# Patient Record
Sex: FEMALE | Race: WHITE | Employment: OTHER | URBAN - METROPOLITAN AREA
[De-identification: names, ages, dates, MRNs, and addresses within clinical notes are randomized per-mention and may not be internally consistent; named-entity substitution may affect disease eponyms.]

---

## 2018-04-23 PROBLEM — I62.00 SUBDURAL BLEEDING (HCC): Status: ACTIVE | Noted: 2018-04-23

## 2018-07-12 ENCOUNTER — OFFICE VISIT (OUTPATIENT)
Dept: ORTHOPEDIC SURGERY | Facility: CLINIC | Age: 68
End: 2018-07-12

## 2018-07-12 VITALS
DIASTOLIC BLOOD PRESSURE: 61 MMHG | OXYGEN SATURATION: 97 % | HEART RATE: 92 BPM | BODY MASS INDEX: 29.63 KG/M2 | TEMPERATURE: 98.7 F | SYSTOLIC BLOOD PRESSURE: 109 MMHG | HEIGHT: 62 IN | RESPIRATION RATE: 16 BRPM | WEIGHT: 161 LBS

## 2018-07-12 DIAGNOSIS — G89.29 CHRONIC BILATERAL BACK PAIN, UNSPECIFIED BACK LOCATION: ICD-10-CM

## 2018-07-12 DIAGNOSIS — M25.561 CHRONIC PAIN OF RIGHT KNEE: ICD-10-CM

## 2018-07-12 DIAGNOSIS — M54.9 CHRONIC BILATERAL BACK PAIN, UNSPECIFIED BACK LOCATION: ICD-10-CM

## 2018-07-12 DIAGNOSIS — G89.29 CHRONIC PAIN OF RIGHT KNEE: ICD-10-CM

## 2018-07-12 DIAGNOSIS — S06.5XAA SUBDURAL HEMATOMA: ICD-10-CM

## 2018-07-12 DIAGNOSIS — S16.1XXA STRAIN OF NECK MUSCLE, INITIAL ENCOUNTER: ICD-10-CM

## 2018-07-12 DIAGNOSIS — S82.131D RIGHT MEDIAL TIBIAL PLATEAU FRACTURE, CLOSED, WITH ROUTINE HEALING, SUBSEQUENT ENCOUNTER: Primary | ICD-10-CM

## 2018-07-12 DIAGNOSIS — M54.2 NECK PAIN: ICD-10-CM

## 2018-07-12 DIAGNOSIS — S39.012A STRAIN OF LUMBAR REGION, INITIAL ENCOUNTER: ICD-10-CM

## 2018-07-12 DIAGNOSIS — R41.3 MEMORY DEFICIT: ICD-10-CM

## 2018-07-12 RX ORDER — BUPROPION HYDROCHLORIDE 100 MG/1
TABLET, EXTENDED RELEASE ORAL
COMMUNITY
End: 2018-12-15

## 2018-07-12 NOTE — PATIENT INSTRUCTIONS
Knee Pain or Injury: Care Instructions Your Care Instructions Injuries are a common cause of knee problems. Sudden (acute) injuries may be caused by a direct blow to the knee. They can also be caused by abnormal twisting, bending, or falling on the knee. Pain, bruising, or swelling may be severe, and may start within minutes of the injury. Overuse is another cause of knee pain. Other causes are climbing stairs, kneeling, and other activities that use the knee. Everyday wear and tear, especially as you get older, also can cause knee pain. Rest, along with home treatment, often relieves pain and allows your knee to heal. If you have a serious knee injury, you may need tests and treatment. Follow-up care is a key part of your treatment and safety. Be sure to make and go to all appointments, and call your doctor if you are having problems. It's also a good idea to know your test results and keep a list of the medicines you take. How can you care for yourself at home? · Be safe with medicines. Read and follow all instructions on the label. ¨ If the doctor gave you a prescription medicine for pain, take it as prescribed. ¨ If you are not taking a prescription pain medicine, ask your doctor if you can take an over-the-counter medicine. · Rest and protect your knee. Take a break from any activity that may cause pain. · Put ice or a cold pack on your knee for 10 to 20 minutes at a time. Put a thin cloth between the ice and your skin. · Prop up a sore knee on a pillow when you ice it or anytime you sit or lie down for the next 3 days. Try to keep it above the level of your heart. This will help reduce swelling. · If your knee is not swollen, you can put moist heat, a heating pad, or a warm cloth on your knee. · If your doctor recommends an elastic bandage, sleeve, or other type of support for your knee, wear it as directed.  
· Follow your doctor's instructions about how much weight you can put on your leg. Use a cane, crutches, or a walker as instructed. · Follow your doctor's instructions about activity during your healing process. If you can do mild exercise, slowly increase your activity. · Reach and stay at a healthy weight. Extra weight can strain the joints, especially the knees and hips, and make the pain worse. Losing even a few pounds may help. When should you call for help? Call 911 anytime you think you may need emergency care. For example, call if: 
  · You have symptoms of a blood clot in your lung (called a pulmonary embolism). These may include: 
¨ Sudden chest pain. ¨ Trouble breathing. ¨ Coughing up blood.  
 Call your doctor now or seek immediate medical care if: 
  · You have severe or increasing pain.  
  · Your leg or foot turns cold or changes color.  
  · You cannot stand or put weight on your knee.  
  · Your knee looks twisted or bent out of shape.  
  · You cannot move your knee.  
  · You have signs of infection, such as: 
¨ Increased pain, swelling, warmth, or redness. ¨ Red streaks leading from the knee. ¨ Pus draining from a place on your knee. ¨ A fever.  
  · You have signs of a blood clot in your leg (called a deep vein thrombosis), such as: 
¨ Pain in your calf, back of the knee, thigh, or groin. ¨ Redness and swelling in your leg or groin.  
 Watch closely for changes in your health, and be sure to contact your doctor if: 
  · You have tingling, weakness, or numbness in your knee.  
  · You have any new symptoms, such as swelling.  
  · You have bruises from a knee injury that last longer than 2 weeks.  
  · You do not get better as expected. Where can you learn more? Go to http://kristofer-dori.info/. Enter K195 in the search box to learn more about \"Knee Pain or Injury: Care Instructions. \" Current as of: November 20, 2017 Content Version: 11.7 © 2097-4130 [employer].  Care instructions adapted under license by [facility] (which disclaims liability or warranty for this information). If you have questions about a medical condition or this instruction, always ask your healthcare professional. [address] any warranty or liability for your use of this information. Broken Lower Leg: Care Instructions Your Care Instructions Treatment for your broken leg will depend on how bad the break is. Your doctor may have put your lower leg in a splint or a cast to allow it to heal or keep it stable until you see another doctor. It may take weeks or months for your leg to heal. You can help it heal with some care at home. You heal best when you take good care of yourself. Eat a variety of healthy foods, and don't smoke. Follow-up care is a key part of your treatment and safety. Be sure to make and go to all appointments, and call your doctor if you are having problems. It's also a good idea to know your test results and keep a list of the medicines you take. How can you care for yourself at home? · Put ice or a cold pack on your lower leg for 10 to 20 minutes at a time. Try to do this every 1 to 2 hours for the next 3 days (when you are awake). Put a thin cloth between the ice and your cast or splint. Keep your cast or splint dry. · Follow the cast care instructions your doctor gives you. If you have a splint, do not take it off unless your doctor tells you to. · Be safe with medicines. Take pain medicines exactly as directed. ¨ If the doctor gave you a prescription medicine for pain, take it as prescribed. ¨ If you are not taking a prescription pain medicine, ask your doctor if you can take an over-the-counter medicine. · Do not put weight on your leg unless your doctor tells you to. Use crutches to walk. · Prop up your leg on pillows when you sit or lie down in the first few days after the injury. Keep your leg higher than the level of your heart. This will help reduce swelling.  
· Follow instructions for exercises to keep your leg strong. · Wiggle your toes often to reduce swelling and stiffness. When should you call for help? Call 911 anytime you think you may need emergency care. For example, call if: 
  · You have chest pain, are short of breath, or you cough up blood.  
  · You are very sleepy and you have trouble waking up.  
 Call your doctor now or seek immediate medical care if: 
  · You have new or worse nausea or vomiting.  
  · You have new or worse pain.  
  · Your foot is cool or pale or changes color.  
  · You have tingling, weakness, or numbness in your toes.  
  · Your cast or splint feels too tight.  
  · You have signs of a blood clot in your leg (called a deep vein thrombosis), such as: 
¨ Pain in your calf, back of the knee, thigh, or groin. ¨ Redness or swelling in your leg.  
 Watch closely for changes in your health, and be sure to contact your doctor if: 
  · You have a problem with your splint or cast.  
  · You do not get better as expected. Where can you learn more? Go to http://kristofer-dori.info/. Enter L198 in the search box to learn more about \"Broken Lower Leg: Care Instructions. \" Current as of: November 29, 2017 Content Version: 11.7 © 4631-7757 [employer]. Care instructions adapted under license by [employer] (which disclaims liability or warranty for this information). If you have questions about a medical condition or this instruction, always ask your healthcare professional. [place] [address] any warranty or liability for your use of this information.
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
## 2018-07-18 ENCOUNTER — DOCUMENTATION ONLY (OUTPATIENT)
Dept: ORTHOPEDIC SURGERY | Facility: CLINIC | Age: 68
End: 2018-07-18

## 2018-08-08 ENCOUNTER — TELEPHONE (OUTPATIENT)
Dept: ORTHOPEDIC SURGERY | Facility: CLINIC | Age: 68
End: 2018-08-08

## 2018-08-08 NOTE — TELEPHONE ENCOUNTER
Bar riojas/ Sosa Jerez patient's  is [id number] on [facility]18 be mailed to the patient as it is difficulty for patient to get to office.   Please call patient at [phone]

## 2019-04-08 ENCOUNTER — OFFICE VISIT (OUTPATIENT)
Dept: ORTHOPEDIC SURGERY | Age: 69
End: 2019-04-08

## 2019-04-08 VITALS
SYSTOLIC BLOOD PRESSURE: 119 MMHG | OXYGEN SATURATION: 96 % | TEMPERATURE: 98.3 F | DIASTOLIC BLOOD PRESSURE: 73 MMHG | RESPIRATION RATE: 12 BRPM | HEIGHT: 62 IN | WEIGHT: 186.4 LBS | BODY MASS INDEX: 34.3 KG/M2 | HEART RATE: 66 BPM

## 2019-04-08 DIAGNOSIS — M54.50 LUMBAR PAIN: ICD-10-CM

## 2019-04-08 DIAGNOSIS — Z87.828 HISTORY OF MOTOR VEHICLE ACCIDENT: ICD-10-CM

## 2019-04-08 DIAGNOSIS — Z72.0 TOBACCO USE: ICD-10-CM

## 2019-04-08 DIAGNOSIS — S32.050D CLOSED COMPRESSION FRACTURE OF L5 LUMBAR VERTEBRA WITH ROUTINE HEALING, SUBSEQUENT ENCOUNTER: ICD-10-CM

## 2019-04-08 DIAGNOSIS — M47.816 LUMBAR FACET ARTHROPATHY: Primary | ICD-10-CM

## 2019-04-08 RX ORDER — MEMANTINE HYDROCHLORIDE 5 MG/1
1 TABLET ORAL 2 TIMES DAILY
Refills: 6 | COMMUNITY
Start: 2019-03-12 | End: 2022-04-11

## 2019-04-08 RX ORDER — METHYLPHENIDATE HYDROCHLORIDE 10 MG/1
10 TABLET ORAL DAILY
COMMUNITY
End: 2022-04-11

## 2019-04-08 RX ORDER — MECLIZINE HYDROCHLORIDE 25 MG/1
1-2 TABLET ORAL AS NEEDED
Refills: 6 | COMMUNITY
Start: 2019-03-24 | End: 2022-04-11

## 2019-04-08 RX ORDER — TAMSULOSIN HYDROCHLORIDE 0.4 MG/1
0.4 CAPSULE ORAL DAILY
COMMUNITY
End: 2019-04-21
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
## 2019-04-08 NOTE — PATIENT INSTRUCTIONS
Preventing Falls: Care Instructions Your Care Instructions Getting around your home safely can be a challenge if you have injuries or health problems that make it easy for you to fall. Loose rugs and furniture in walkways are among the dangers for many older people who have problems walking or who have poor eyesight. People who have conditions such as arthritis, osteoporosis, or dementia also have to be careful not to fall. You can make your home safer with a few simple measures. Follow-up care is a key part of your treatment and safety. Be sure to make and go to all appointments, and call your doctor if you are having problems. It's also a good idea to know your test results and keep a list of the medicines you take. How can you care for yourself at home? Taking care of yourself · You may get dizzy if you do not drink enough water. To prevent dehydration, drink plenty of fluids, enough so that your urine is light yellow or clear like water. Choose water and other caffeine-free clear liquids. If you have kidney, heart, or liver disease and have to limit fluids, talk with your doctor before you increase the amount of fluids you drink. · Exercise regularly to improve your strength, muscle tone, and balance. Walk if you can. Swimming may be a good choice if you cannot walk easily. · Have your vision and hearing checked each year or any time you notice a change. If you have trouble seeing and hearing, you might not be able to avoid objects and could lose your balance. · Know the side effects of the medicines you take. Ask your doctor or pharmacist whether the medicines you take can affect your balance. Sleeping pills or sedatives can affect your balance. · Limit the amount of alcohol you drink. Alcohol can impair your balance and other senses. · Ask your doctor whether calluses or corns on your feet need to be removed.  If you wear loose-fitting shoes because of calluses or corns, you can lose your balance and fall. · Talk to your doctor if you have numbness in your feet. Preventing falls at home · Remove raised doorway thresholds, throw rugs, and clutter. Repair loose carpet or raised areas in the floor. · Move furniture and electrical cords to keep them out of walking paths. · Use nonskid floor wax, and wipe up spills right away, especially on ceramic tile floors. · If you use a walker or cane, put rubber tips on it. If you use crutches, clean the bottoms of them regularly with an abrasive pad, such as steel wool. · Keep your house well lit, especially Lieutenant Angy, and outside walkways. Use night-lights in areas such as hallways and bathrooms. Add extra light switches or use remote switches (such as switches that go on or off when you clap your hands) to make it easier to turn lights on if you have to get up during the night. · Install sturdy handrails on stairways. · Move items in your cabinets so that the things you use a lot are on the lower shelves (about waist level). · Keep a cordless phone and a flashlight with new batteries by your bed. If possible, put a phone in each of the main rooms of your house, or carry a cell phone in case you fall and cannot reach a phone. Or, you can wear a device around your neck or wrist. You push a button that sends a signal for help. · Wear low-heeled shoes that fit well and give your feet good support. Use footwear with nonskid soles. Check the heels and soles of your shoes for wear. Repair or replace worn heels or soles. · Do not wear socks without shoes on wood floors. · Walk on the grass when the sidewalks are slippery. If you live in an area that gets snow and ice in the winter, sprinkle salt on slippery steps and sidewalks. Preventing falls in the bath · Install grab bars and nonskid mats inside and outside your shower or tub and near the toilet and sinks. · Use shower chairs and bath benches. · Use a hand-held shower head that will allow you to sit while showering. · Get into a tub or shower by putting the weaker leg in first. Get out of a tub or shower with your strong side first. 
· Repair loose toilet seats and consider installing a raised toilet seat to make getting on and off the toilet easier. · Keep your bathroom door unlocked while you are in the shower. Where can you learn more? Go to http://kristofer-dori.info/. Enter [id number] [id number] in the search box to learn more about \"Preventing Falls: Care Instructions. \" Current as of: March 15, 2018 Content Version: 11.9 © 0926-4370 [employer]. Care instructions adapted under license by "[employer]" (which disclaims liability or warranty for this information). If you have questions about a medical condition or this instruction, always ask your healthcare professional. [place] [address] any warranty or liability for your use of this information. Preventing Falls: Care Instructions Your Care Instructions Getting around your home safely can be a challenge if you have injuries or health problems that make it easy for you to fall. Loose rugs and furniture in walkways are among the dangers for many older people who have problems walking or who have poor eyesight. People who have conditions such as arthritis, osteoporosis, or dementia also have to be careful not to fall. You can make your home safer with a few simple measures. Follow-up care is a key part of your treatment and safety. Be sure to make and go to all appointments, and call your doctor if you are having problems. It's also a good idea to know your test results and keep a list of the medicines you take. How can you care for yourself at home? Taking care of yourself · You may get dizzy if you do not drink enough water.  To prevent dehydration, drink plenty of fluids, enough so that your urine is light yellow or clear like water. Choose water and other caffeine-free clear liquids. If you have kidney, heart, or liver disease and have to limit fluids, talk with your doctor before you increase the amount of fluids you drink. · Exercise regularly to improve your strength, muscle tone, and balance. Walk if you can. Swimming may be a good choice if you cannot walk easily. · Have your vision and hearing checked each year or any time you notice a change. If you have trouble seeing and hearing, you might not be able to avoid objects and could lose your balance. · Know the side effects of the medicines you take. Ask your doctor or pharmacist whether the medicines you take can affect your balance. Sleeping pills or sedatives can affect your balance. · Limit the amount of alcohol you drink. Alcohol can impair your balance and other senses. · Ask your doctor whether calluses or corns on your feet need to be removed. If you wear loose-fitting shoes because of calluses or corns, you can lose your balance and fall. · Talk to your doctor if you have numbness in your feet. Preventing falls at home · Remove raised doorway thresholds, throw rugs, and clutter. Repair loose carpet or raised areas in the floor. · Move furniture and electrical cords to keep them out of walking paths. · Use nonskid floor wax, and wipe up spills right away, especially on ceramic tile floors. · If you use a walker or cane, put rubber tips on it. If you use crutches, clean the bottoms of them regularly with an abrasive pad, such as steel wool. · Keep your house well lit, especially Kaylin Welch, and outside walkways. Use night-lights in areas such as hallways and bathrooms. Add extra light switches or use remote switches (such as switches that go on or off when you clap your hands) to make it easier to turn lights on if you have to get up during the night. · Install sturdy handrails on stairways. · Move items in your cabinets so that the things you use a lot are on the lower shelves (about waist level). · Keep a cordless phone and a flashlight with new batteries by your bed. If possible, put a phone in each of the main rooms of your house, or carry a cell phone in case you fall and cannot reach a phone. Or, you can wear a device around your neck or wrist. You push a button that sends a signal for help. · Wear low-heeled shoes that fit well and give your feet good support. Use footwear with nonskid soles. Check the heels and soles of your shoes for wear. Repair or replace worn heels or soles. · Do not wear socks without shoes on wood floors. · Walk on the grass when the sidewalks are slippery. If you live in an area that gets snow and ice in the winter, sprinkle salt on slippery steps and sidewalks. Preventing falls in the bath · Install grab bars and nonskid mats inside and outside your shower or tub and near the toilet and sinks. · Use shower chairs and bath benches. · Use a hand-held shower head that will allow you to sit while showering. · Get into a tub or shower by putting the weaker leg in first. Get out of a tub or shower with your strong side first. 
· Repair loose toilet seats and consider installing a raised toilet seat to make getting on and off the toilet easier. · Keep your bathroom door unlocked while you are in the shower. Where can you learn more? Go to http://kristofer-dori.info/. Enter [id number] in the search box to learn more about \"Preventing Falls: Care Instructions. \" Current as of: March 15, 2018 Content Version: 11.9 © 8915-0288 [employer]. Care instructions adapted under license by [employer] (which disclaims liability or warranty for this information).  If you have questions about a medical condition or this instruction, always ask your healthcare professional. [employer] disclaims any warranty or liability for your use of this information. Neck Arthritis: Exercises Your Care Instructions Here are some examples of typical rehabilitation exercises for your condition. Start each exercise slowly. Ease off the exercise if you start to have pain. Your doctor or physical therapist will tell you when you can start these exercises and which ones will work best for you. How to do the exercises Neck stretches to the side 1. This stretch works best if you keep your shoulder down as you lean away from it. To help you remember to do this, start by relaxing your shoulders and lightly holding on to your thighs or your chair. 2. Tilt your head toward your shoulder and hold for 15 to 30 seconds. Let the weight of your head stretch your muscles. 3. Repeat 2 to 4 times toward each shoulder. Chin tuck 1. Lie on the floor with a rolled-up towel under your neck. Your head should be touching the floor. 2. Slowly bring your chin toward your chest. 
3. Hold for a count of 6, and then relax for up to 10 seconds. 4. Repeat 8 to 12 times. Active cervical rotation 1. Sit in a firm chair, or stand up straight. 2. Keeping your chin level, turn your head to the right, and hold for 15 to 30 seconds. 3. Turn your head to the left and hold for 15 to 30 seconds. 4. Repeat 2 to 4 times to each side. Shoulder blade squeeze 1. While standing, squeeze your shoulder blades together. 2. Do not raise your shoulders up as you are squeezing. 3. Hold for 6 seconds. 4. Repeat 8 to 12 times. Shoulder rolls 1. Sit comfortably with your feet shoulder-width apart. You can also do this exercise standing up. 2. Roll your shoulders up, then back, and then down in a smooth, circular motion. 3. Repeat 2 to 4 times. Follow-up care is a key part of your treatment and safety.  Be sure to make and go to all appointments, and call your doctor if you are having problems. It's also a good idea to know your test results and keep a list of the medicines you take. Where can you learn more? Go to http://kristofer-dori.info/. Enter G576 in the search box to learn more about \"Neck Arthritis: Exercises. \" Current as of: September 20, 2018 Content Version: 11.9 © 8949-7804 [employer]. Care instructions adapted under license by [employer]® (which disclaims liability or warranty for this information). If you have questions about a medical condition or this instruction, always ask your healthcare professional. [address] any warranty or liability for your use of this information. Low Back Arthritis: Exercises Your Care Instructions Here are some examples of typical rehabilitation exercises for your condition. Start each exercise slowly. Ease off the exercise if you start to have pain. Your doctor or physical therapist will tell you when you can start these exercises and which ones will work best for you. When you are not being active, find a comfortable position for rest. Some people are comfortable on the floor or a medium-firm bed with a small pillow under their head and another under their knees. Some people prefer to lie on their side with a pillow between their knees. Don't stay in one position for too long. Take short walks (10 to 20 minutes) every 2 to 3 hours. Avoid slopes, hills, and stairs until you feel better. Walk only distances you can manage without pain, especially leg pain. How to do the exercises Pelvic tilt 4. Lie on your back with your knees bent. 5. \"Brace\" your stomachtighten your muscles by pulling in and imagining your belly button moving toward your spine. 6. Press your lower back into the floor. You should feel your hips and pelvis rock back. 7. Hold for 6 seconds while breathing smoothly. 8. Relax and allow your pelvis and hips to rock forward. 9. Repeat 8 to 12 times. Back stretches 5. Get down on your hands and knees on the floor. 6. Relax your head and allow it to droop. Round your back up toward the ceiling until you feel a nice stretch in your upper, middle, and lower back. Hold this stretch for as long as it feels comfortable, or about 15 to 30 seconds. 7. Return to the starting position with a flat back while you are on your hands and knees. 8. Let your back sway by pressing your stomach toward the floor. Lift your buttocks toward the ceiling. 9. Hold this position for 15 to 30 seconds. 10. Repeat 2 to 4 times. Follow-up care is a key part of your treatment and safety. Be sure to make and go to all appointments, and call your doctor if you are having problems. It's also a good idea to know your test results and keep a list of the medicines you take. Where can you learn more? Go to http://kristofer-dori.info/. Enter H014 in the search box to learn more about \"Low Back Arthritis: Exercises. \" Current as of: September 20, 2018 Content Version: 11.9 © 8281-0429 [employer]. Care instructions adapted under license by [employer] (which disclaims liability or warranty for this information). If you have questions about a medical condition or this instruction, always ask your healthcare professional. [address] any warranty or liability for your use of this information. Learning About Benefits From Quitting Smoking How does quitting smoking make you healthier? If you're thinking about quitting smoking, you may have a few reasons to be smoke-free. Your health may be one of them. · When you quit smoking, you lower your risks for cancer, lung disease, heart attack, stroke, blood vessel disease, and blindness from macular degeneration. · When you're smoke-free, you get sick less often, and you heal faster. You are less likely to get colds, flu, bronchitis, and pneumonia. · As a nonsmoker, you may find that your mood is better and you are less stressed. When and how will you feel healthier? Quitting has real health benefits that start from day 1 of being smoke-free. And the longer you stay smoke-free, the healthier you get and the better you feel. The first hours · After just 20 minutes, your blood pressure and heart rate go down. That means there's less stress on your heart and blood vessels. · Within 12 hours, the level of carbon monoxide in your blood drops back to normal. That makes room for more oxygen. With more oxygen in your body, you may notice that you have more energy than when you smoked. After 2 weeks · Your lungs start to work better. · Your risk of heart attack starts to drop. After 1 month · When your lungs are clear, you cough less and breathe deeper, so it's easier to be active. · Your sense of taste and smell return. That means you can enjoy food more than you have since you started smoking. Over the years · After 1 year, your risk of heart disease is half what it would be if you kept smoking. · After 5 years, your risk of stroke starts to shrink. Within a few years after that, it's about the same as if you'd never smoked. · After 10 years, your risk of dying from lung cancer is cut by about half. And your risk for many other types of cancer is lower too. How would quitting help others in your life? When you quit smoking, you improve the health of everyone who now breathes in your smoke. · Their heart, lung, and cancer risks drop, much like yours. · They are sick less. For babies and small children, living smoke-free means they're less likely to have ear infections, pneumonia, and bronchitis. · If you're a woman who is or will be pregnant someday, quitting smoking means a healthier . · Children who are close to you are less likely to become adult smokers. Where can you learn more? Go to http://kristofer-dori.info/. Enter [id number] in the search box to learn more about \"Learning About Benefits From Quitting Smoking. \" Current as of: September 26, 2018 Content Version: 11.9 © 8397-3541 [employer]. Care instructions adapted under license by [employer] (which disclaims liability or warranty for this information). If you have questions about a medical condition or this instruction, always ask your healthcare professional. [place] [address] any warranty or liability for your use of this information.

## 2019-04-08 NOTE — PROGRESS NOTES
Christina Herbert presents today for Chief Complaint Patient presents with  Back Pain  
  lower  New Patient  
  referred by Dr. Kennedy Alt Is someone accompanying this pt? no 
 
Is the patient using any DME equipment during [address]? NO Depression Screening: 
3 most recent PHQ Screens 4/8/2019 PHQ Not Done Active Diagnosis of Depression or Bipolar Disorder Little interest or pleasure in doing things Several days Feeling down, depressed, irritable, or hopeless Several days Total Score PHQ 2 2 Trouble falling or staying asleep, or sleeping too much - Feeling tired or having little energy - Poor appetite, weight loss, or overeating - Feeling bad about yourself - or that you are a failure or have let yourself or your family down - Trouble concentrating on things such as school, work, reading, or watching TV - Moving or speaking so slowly that other people could have noticed; or the opposite being so fidgety that others notice - Thoughts of being better off dead, or hurting yourself in some way -  
PHQ 9 Score - How difficult have these problems made it for you to do your work, take care of your home and get along with others - Learning Assessment: 
Learning Assessment 4/8/2019 PRIMARY LEARNER Patient HIGHEST LEVEL OF EDUCATION - PRIMARY LEARNER  GRADUATED HIGH SCHOOL OR GED  
BARRIERS PRIMARY LEARNER COGNITIVE  
CO-LEARNER CAREGIVER No  
PRIMARY LANGUAGE ENGLISH  
LEARNER PREFERENCE PRIMARY DEMONSTRATION  
ANSWERED BY Patient RELATIONSHIP SELF Abuse Screening: No flowsheet data found. Fall Risk Fall Risk Assessment, last 12 mths 4/8/2019 Able to walk? Yes Fall in past 12 months? Yes Fall with injury? Yes  
Number of falls in past 12 months 1 Fall Risk Score 2  
 
 
OPIOID RISK TOOL No flowsheet data found. Pt currently taking Antiplatelet therapy? No 
 
Coordination of Care: 1. Have you been to the ER, urgent care clinic since your last visit?  Yes, [facility] ED in 12/18, 03/19 for kidney stones  Hospitalized since your last visit? No 
 
2. Have you seen or consulted any other health care providers outside of the [address] [place] [address] since your last visit? No Include any pap smears or colon screening. No 
 
Last  Checked 04/08/19 Last UDS Checked N/A Last Pain Agreement Signed n/a

## 2019-04-08 NOTE — LETTER
4/9/19 Patient: Pascale Collier YOB: 1950 Date of Visit: 4/8/2019 Laura Handy MD 
[address] [facility] [address] [address] VIA Facsimile: [phone] Dear Laura Handy MD, Thank you for referring Ms. Camila Medina to [facility] for evaluation. My notes for this consultation are attached. If you have questions, please do not hesitate to call me. I look forward to following your patient along with you. Sincerely, Carmen Harmon MD

## 2019-05-02 ENCOUNTER — HOSPITAL ENCOUNTER (OUTPATIENT)
Dept: PREADMISSION TESTING | Age: 69
Discharge: HOME | End: 2019-05-02
Payer: MEDICARE

## 2019-05-02 ENCOUNTER — ANESTHESIA EVENT (OUTPATIENT)
Dept: SURGERY | Age: 69
End: 2019-05-02
Payer: MEDICARE

## 2019-05-02 ENCOUNTER — OFFICE VISIT (OUTPATIENT)
Dept: UROLOGY | Age: 69
End: 2019-05-02

## 2019-05-02 VITALS
HEART RATE: 111 BPM | SYSTOLIC BLOOD PRESSURE: 110 MMHG | BODY MASS INDEX: 34.6 KG/M2 | OXYGEN SATURATION: 97 % | HEIGHT: 62 IN | WEIGHT: 188 LBS | DIASTOLIC BLOOD PRESSURE: 80 MMHG

## 2019-05-02 DIAGNOSIS — Z01.818 PRE-OP TESTING: ICD-10-CM

## 2019-05-02 DIAGNOSIS — N20.0 KIDNEY STONE: ICD-10-CM

## 2019-05-02 DIAGNOSIS — N20.0 KIDNEY STONE: Primary | ICD-10-CM

## 2019-05-02 LAB
ANION GAP SERPL CALC-SCNC: 2 MMOL/L (ref 3–18)
BILIRUB UR QL STRIP: NEGATIVE
BUN SERPL-MCNC: 17 MG/DL (ref 7–18)
BUN/CREAT SERPL: 17 (ref 12–20)
CALCIUM SERPL-MCNC: 9 MG/DL (ref 8.5–10.1)
CHLORIDE SERPL-SCNC: 106 MMOL/L (ref 100–108)
CO2 SERPL-SCNC: 33 MMOL/L (ref 21–32)
CREAT SERPL-MCNC: 1.02 MG/DL (ref 0.6–1.3)
ERYTHROCYTE [DISTWIDTH] IN BLOOD BY AUTOMATED COUNT: 12.4 % (ref 11.6–14.5)
GLUCOSE SERPL-MCNC: 113 MG/DL (ref 74–99)
GLUCOSE UR-MCNC: NEGATIVE MG/DL
HCT VFR BLD AUTO: 37.8 % (ref 35–45)
HGB BLD-MCNC: 12.7 G/DL (ref 12–16)
KETONES P FAST UR STRIP-MCNC: NEGATIVE MG/DL
MCH RBC QN AUTO: 30.2 PG (ref 24–34)
MCHC RBC AUTO-ENTMCNC: 33.6 G/DL (ref 31–37)
MCV RBC AUTO: 89.8 FL (ref 74–97)
PH UR STRIP: 5.5 [PH] (ref 4.6–8)
PLATELET # BLD AUTO: 240 K/UL (ref 135–420)
PMV BLD AUTO: 8.6 FL (ref 9.2–11.8)
POTASSIUM SERPL-SCNC: 3.9 MMOL/L (ref 3.5–5.5)
PROT UR QL STRIP: NEGATIVE
RBC # BLD AUTO: 4.21 M/UL (ref 4.2–5.3)
SODIUM SERPL-SCNC: 141 MMOL/L (ref 136–145)
SP GR UR STRIP: 1.01 (ref 1–1.03)
UA UROBILINOGEN AMB POC: NORMAL (ref 0.2–1)
URINALYSIS CLARITY POC: CLEAR
URINALYSIS COLOR POC: YELLOW
URINE BLOOD POC: NORMAL
URINE LEUKOCYTES POC: NORMAL
URINE NITRITES POC: NEGATIVE
WBC # BLD AUTO: 7.2 K/UL (ref 4.6–13.2)

## 2019-05-02 PROCEDURE — 80048 BASIC METABOLIC PNL TOTAL CA: CPT

## 2019-05-02 PROCEDURE — 85027 COMPLETE CBC AUTOMATED: CPT

## 2019-05-02 PROCEDURE — 36415 COLL VENOUS BLD VENIPUNCTURE: CPT

## 2019-05-02 PROCEDURE — 93005 ELECTROCARDIOGRAM TRACING: CPT

## 2019-05-02 RX ORDER — SODIUM CHLORIDE 9 MG/ML
100 INJECTION, SOLUTION INTRAVENOUS CONTINUOUS
Status: CANCELLED | OUTPATIENT
Start: 2019-05-02

## 2019-05-02 RX ORDER — IBUPROFEN 800 MG/1
TABLET ORAL
COMMUNITY
End: 2022-04-11

## 2019-05-02 NOTE — PROGRESS NOTES
Lupillo Calvert 77 y.o. female     Ms. Cabello Patient seen today for evaluation of kidney stone disease presenting initially to the emergency room at [facility] in December 2018 complaining of colicky right flank pain finding a 5 mm stone obstructing the proximal right ureter on CT scan imaging-patient has experienced several episodes of colicky pain on the right side with a follow-up CT scan imaging on 22 March 2019 showing a 5 mm stone in the distal right ureter    CT scan imaging of the abdomen and pelvis reviewed on CD today-3 separate CT scan studies in December 2018 and March 2019 show a 5 mm stone progressing down the right ureter causing right-sided hydronephrosis-no sign of stone densities in either kidney nor the distribution of the left ureter-images have been reviewed with the patient on CD-laser printing images not allowed by disc operating system      Review of Systems:   CNS: No seizure syncope headaches  or visual changes/depression  Respiratory: No wheezing shortness of breath chest pain or coughing  Cardiovascular: No palpitations no angina  Intestinal GERD No diarrhea or constipation  Urinary: Kidney stones no history of UTIs no gross hematuria no dysuria  Skeletal: No bone or joint pain  Endocrine: No diabetes no thyroid disease  Other:    Allergies: Allergies   Allergen Reactions    Darvon [Propoxyphene] Vertigo    Sulfa (Sulfonamide Antibiotics) Rash      Medications:    Current Outpatient Medications   Medication Sig Dispense Refill    ibuprofen (MOTRIN) 800 mg tablet Take  by mouth.  tamsulosin (FLOMAX) 0.4 mg capsule Take 1 Cap by mouth daily. 15 Cap 0    meclizine (ANTIVERT) 25 mg tablet Take 1-2 Tabs by mouth as needed. 6    memantine (NAMENDA) 5 mg tablet Take 1 Tab by mouth two (2) times a day. 6    amitriptyline (ELAVIL) 25 mg tablet Take 25 mg by mouth nightly.       ondansetron (ZOFRAN ODT) 4 mg disintegrating tablet Take 1 Tab by mouth every eight (8) hours as needed for Nausea. 12 Tab 0    methylphenidate HCl (RITALIN) 10 mg tablet Take 10 mg by mouth daily.          Past Medical History:   Diagnosis Date    Anxiety     Back problem     Constipation     Depression     Depression     Heartburn     High cholesterol     Kidney stone     Neurological disorder     Hematoma removal head    Poor appetite     Sleep trouble     Tiredness     Vertigo       Past Surgical History:   Procedure Laterality Date    HX  SECTION      [place] 14    Surgery for ectopic pregnancy    HX OTHER SURGICAL  2018    \"cauterized brain bleed, and drained blood\" per patient at [facility]    [address] Marital status:      Spouse name: Not on file    Number of children: Not on file    Years of education: Not on file    Highest education level: Not on file   Occupational History    Not on file   Social Needs    Financial resource strain: Not on file    Food insecurity:     Worry: Not on file     Inability: Not on file    Transportation needs:     Medical: Not on file     Non-medical: Not on file   Tobacco Use    Smoking status: Current Every Day Smoker     Packs/day: 0.25    Smokeless tobacco: Never Used   Substance and Sexual Activity    Alcohol use: No    Drug use: No    Sexual activity: Never   Lifestyle    Physical activity:     Days per week: Not on file     Minutes per session: Not on file    Stress: Not on file   Relationships    Social connections:     Talks on phone: Not on file     Gets together: Not on file     Attends Anabaptism service: Not on file     Active member of club or organization: Not on file     Attends meetings of clubs or organizations: Not on file     Relationship status: Not on file    Intimate partner violence:     Fear of current or ex partner: Not on file     Emotionally abused: Not on file     Physically abused: Not on file     Forced sexual activity: Not on file Other Topics Concern     Service Not Asked    Blood Transfusions Not Asked    Caffeine Concern Not Asked    Occupational Exposure Not Asked    Hobby Hazards Not Asked    Sleep Concern Not Asked    Stress Concern Not Asked    Weight Concern Not Asked    Special Diet Not Asked    Back Care Not Asked    Exercise Not Asked    Bike Helmet Not Asked   [address] Not Asked    Self-Exams Not Asked   Social History Narrative    Not on file      Family History   Problem Relation Age of Onset    Diabetes Father     Heart Disease Father     Cancer Maternal Grandmother         Physical Examination: Well-nourished mature female in no apparent distress  Neck: No masses nodes or bruits  Lungs: Clear breath sounds bilaterally no wheezes rales or rhonchi  Heart: Regular sinus rhythm no murmurs no gallops no rubs  Abdomen: Nontender no palpable masses no organomegaly no bruits  Back: No percussion CVA tenderness  Skin: Warm and dry no rash or lesions  Neurologic: No motor or sensory deficits in arms or legs       Urinalysis: +heme/negative nitrite    Impression: 5 mm right ureteral stone in transit x5 months        Plan: Right sided uteroscopic laser lithotripsy with stent placement    Kidney stone precautions    Counseling Note    Indications for an technique of ureteroscopic laser lithotripsy with stent placement have been described discussed with patient who understands and accepts the risk of bleeding, infection, obstruction, as well as discomfort for the presence of an indwelling stent postoperatively      Teresa Juárez MD  -electronically signed-    PLEASE NOTE:  This document has been produced using voice recognition software. Unrecognized errors in transcription may be present.

## 2019-05-03 ENCOUNTER — HOSPITAL ENCOUNTER (OUTPATIENT)
Age: 69
Setting detail: OUTPATIENT SURGERY
Discharge: HOME | End: 2019-05-03
Attending: UROLOGY | Admitting: UROLOGY
Payer: MEDICARE

## 2019-05-03 ENCOUNTER — ANESTHESIA (OUTPATIENT)
Dept: SURGERY | Age: 69
End: 2019-05-03
Payer: MEDICARE

## 2019-05-03 ENCOUNTER — APPOINTMENT (OUTPATIENT)
Dept: GENERAL RADIOLOGY | Age: 69
End: 2019-05-03
Attending: UROLOGY
Payer: MEDICARE

## 2019-05-03 VITALS
TEMPERATURE: 98.1 F | OXYGEN SATURATION: 96 % | RESPIRATION RATE: 16 BRPM | BODY MASS INDEX: 34.3 KG/M2 | HEART RATE: 101 BPM | DIASTOLIC BLOOD PRESSURE: 73 MMHG | WEIGHT: 186.38 LBS | SYSTOLIC BLOOD PRESSURE: 117 MMHG | HEIGHT: 62 IN

## 2019-05-03 LAB
ATRIAL RATE: 103 BPM
CALCULATED P AXIS, ECG09: 58 DEGREES
CALCULATED R AXIS, ECG10: 47 DEGREES
CALCULATED T AXIS, ECG11: 40 DEGREES
DIAGNOSIS, 93000: NORMAL
P-R INTERVAL, ECG05: 158 MS
Q-T INTERVAL, ECG07: 348 MS
QRS DURATION, ECG06: 106 MS
QTC CALCULATION (BEZET), ECG08: 455 MS
VENTRICULAR RATE, ECG03: 103 BPM

## 2019-05-03 PROCEDURE — 74011250636 HC RX REV CODE- 250/636: Performed by: NURSE ANESTHETIST, CERTIFIED REGISTERED

## 2019-05-03 PROCEDURE — 74011250637 HC RX REV CODE- 250/637: Performed by: NURSE ANESTHETIST, CERTIFIED REGISTERED

## 2019-05-03 PROCEDURE — 74011000258 HC RX REV CODE- 258

## 2019-05-03 PROCEDURE — 74011250636 HC RX REV CODE- 250/636

## 2019-05-03 PROCEDURE — 76060000032 HC ANESTHESIA 0.5 TO 1 HR: Performed by: UROLOGY

## 2019-05-03 PROCEDURE — 74011000250 HC RX REV CODE- 250: Performed by: UROLOGY

## 2019-05-03 PROCEDURE — 76210000021 HC REC RM PH II 0.5 TO 1 HR: Performed by: UROLOGY

## 2019-05-03 PROCEDURE — 77030020782 HC GWN BAIR PAWS FLX 3M -B: Performed by: UROLOGY

## 2019-05-03 PROCEDURE — 77030019605: Performed by: UROLOGY

## 2019-05-03 PROCEDURE — 77030019903 HC CATH URET INT BARD -A: Performed by: UROLOGY

## 2019-05-03 PROCEDURE — 74011636320 HC RX REV CODE- 636/320: Performed by: UROLOGY

## 2019-05-03 PROCEDURE — C1758 CATHETER, URETERAL: HCPCS | Performed by: UROLOGY

## 2019-05-03 PROCEDURE — 77030018836 HC SOL IRR NACL ICUM -A: Performed by: UROLOGY

## 2019-05-03 PROCEDURE — C2617 STENT, NON-COR, TEM W/O DEL: HCPCS | Performed by: UROLOGY

## 2019-05-03 PROCEDURE — 76210000006 HC OR PH I REC 0.5 TO 1 HR: Performed by: UROLOGY

## 2019-05-03 PROCEDURE — C1769 GUIDE WIRE: HCPCS | Performed by: UROLOGY

## 2019-05-03 PROCEDURE — C1894 INTRO/SHEATH, NON-LASER: HCPCS | Performed by: UROLOGY

## 2019-05-03 PROCEDURE — 77030019927 HC TBNG IRR CYSTO BAXT -A: Performed by: UROLOGY

## 2019-05-03 PROCEDURE — 74011250637 HC RX REV CODE- 250/637: Performed by: UROLOGY

## 2019-05-03 PROCEDURE — 74420 UROGRAPHY RTRGR +-KUB: CPT

## 2019-05-03 PROCEDURE — 77030032490 HC SLV COMPR SCD KNE COVD -B: Performed by: UROLOGY

## 2019-05-03 PROCEDURE — 76010000160 HC OR TIME 0.5 TO 1 HR INTENSV-TIER 1: Performed by: UROLOGY

## 2019-05-03 DEVICE — URETERAL STENT
Type: IMPLANTABLE DEVICE | Site: URETER | Status: FUNCTIONAL
Brand: POLARIS™ ULTRA

## 2019-05-03 RX ORDER — MAGNESIUM SULFATE 100 %
4 CRYSTALS MISCELLANEOUS AS NEEDED
Status: DISCONTINUED | OUTPATIENT
Start: 2019-05-03 | End: 2019-05-04 | Stop reason: HOSPADM

## 2019-05-03 RX ORDER — SODIUM CHLORIDE 9 MG/ML
100 INJECTION, SOLUTION INTRAVENOUS CONTINUOUS
Status: DISCONTINUED | OUTPATIENT
Start: 2019-05-03 | End: 2019-05-03 | Stop reason: HOSPADM

## 2019-05-03 RX ORDER — CEFAZOLIN SODIUM IN 0.9 % NACL 2 G/100 ML
PLASTIC BAG, INJECTION (ML) INTRAVENOUS AS NEEDED
Status: DISCONTINUED | OUTPATIENT
Start: 2019-05-03 | End: 2019-05-03 | Stop reason: HOSPADM

## 2019-05-03 RX ORDER — DOCUSATE SODIUM 100 MG/1
100 CAPSULE, LIQUID FILLED ORAL 2 TIMES DAILY
Qty: 10 CAP | Refills: 2 | Status: SHIPPED | OUTPATIENT
Start: 2019-05-03 | End: 2019-05-08

## 2019-05-03 RX ORDER — LIDOCAINE HYDROCHLORIDE 20 MG/ML
INJECTION, SOLUTION EPIDURAL; INFILTRATION; INTRACAUDAL; PERINEURAL AS NEEDED
Status: DISCONTINUED | OUTPATIENT
Start: 2019-05-03 | End: 2019-05-03 | Stop reason: HOSPADM

## 2019-05-03 RX ORDER — CIPROFLOXACIN 250 MG/1
250 TABLET, FILM COATED ORAL DAILY
Qty: 10 TAB | Refills: 0 | Status: SHIPPED | OUTPATIENT
Start: 2019-05-03 | End: 2019-05-13

## 2019-05-03 RX ORDER — SODIUM CHLORIDE 0.9 % (FLUSH) 0.9 %
5-40 SYRINGE (ML) INJECTION AS NEEDED
Status: DISCONTINUED | OUTPATIENT
Start: 2019-05-03 | End: 2019-05-03 | Stop reason: HOSPADM

## 2019-05-03 RX ORDER — PHENAZOPYRIDINE HYDROCHLORIDE 200 MG/1
200 TABLET, FILM COATED ORAL
Status: DISCONTINUED | OUTPATIENT
Start: 2019-05-04 | End: 2019-05-03

## 2019-05-03 RX ORDER — ONDANSETRON 2 MG/ML
INJECTION INTRAMUSCULAR; INTRAVENOUS AS NEEDED
Status: DISCONTINUED | OUTPATIENT
Start: 2019-05-03 | End: 2019-05-03 | Stop reason: HOSPADM

## 2019-05-03 RX ORDER — SODIUM CHLORIDE 0.9 % (FLUSH) 0.9 %
5-40 SYRINGE (ML) INJECTION AS NEEDED
Status: DISCONTINUED | OUTPATIENT
Start: 2019-05-03 | End: 2019-05-04 | Stop reason: HOSPADM

## 2019-05-03 RX ORDER — FENTANYL CITRATE 50 UG/ML
INJECTION, SOLUTION INTRAMUSCULAR; INTRAVENOUS AS NEEDED
Status: DISCONTINUED | OUTPATIENT
Start: 2019-05-03 | End: 2019-05-03 | Stop reason: HOSPADM

## 2019-05-03 RX ORDER — SODIUM CHLORIDE, SODIUM LACTATE, POTASSIUM CHLORIDE, CALCIUM CHLORIDE 600; 310; 30; 20 MG/100ML; MG/100ML; MG/100ML; MG/100ML
INJECTION, SOLUTION INTRAVENOUS
Status: DISCONTINUED | OUTPATIENT
Start: 2019-05-03 | End: 2019-05-03 | Stop reason: HOSPADM

## 2019-05-03 RX ORDER — CEFAZOLIN SODIUM 2 G/50ML
2 SOLUTION INTRAVENOUS ONCE
Status: DISCONTINUED | OUTPATIENT
Start: 2019-05-03 | End: 2019-05-03 | Stop reason: HOSPADM

## 2019-05-03 RX ORDER — INSULIN LISPRO 100 [IU]/ML
INJECTION, SOLUTION INTRAVENOUS; SUBCUTANEOUS ONCE
Status: DISCONTINUED | OUTPATIENT
Start: 2019-05-03 | End: 2019-05-03 | Stop reason: HOSPADM

## 2019-05-03 RX ORDER — PHENAZOPYRIDINE HYDROCHLORIDE 200 MG/1
200 TABLET, FILM COATED ORAL
Qty: 30 TAB | Refills: 3 | Status: SHIPPED | OUTPATIENT
Start: 2019-05-03 | End: 2019-05-06

## 2019-05-03 RX ORDER — NEOMYCIN AND POLYMYXIN B SULFATES 40; 200000 MG/ML; [USP'U]/ML
SOLUTION IRRIGATION AS NEEDED
Status: DISCONTINUED | OUTPATIENT
Start: 2019-05-03 | End: 2019-05-03 | Stop reason: HOSPADM

## 2019-05-03 RX ORDER — FAMOTIDINE 20 MG/1
20 TABLET, FILM COATED ORAL ONCE
Status: COMPLETED | OUTPATIENT
Start: 2019-05-03 | End: 2019-05-03

## 2019-05-03 RX ORDER — DEXTROSE 50 % IN WATER (D50W) INTRAVENOUS SYRINGE
25-50 AS NEEDED
Status: DISCONTINUED | OUTPATIENT
Start: 2019-05-03 | End: 2019-05-04 | Stop reason: HOSPADM

## 2019-05-03 RX ORDER — SODIUM CHLORIDE, SODIUM LACTATE, POTASSIUM CHLORIDE, CALCIUM CHLORIDE 600; 310; 30; 20 MG/100ML; MG/100ML; MG/100ML; MG/100ML
75 INJECTION, SOLUTION INTRAVENOUS CONTINUOUS
Status: DISCONTINUED | OUTPATIENT
Start: 2019-05-03 | End: 2019-05-03 | Stop reason: HOSPADM

## 2019-05-03 RX ORDER — MORPHINE SULFATE 4 MG/ML
2 INJECTION, SOLUTION INTRAMUSCULAR; INTRAVENOUS
Status: DISCONTINUED | OUTPATIENT
Start: 2019-05-03 | End: 2019-05-04 | Stop reason: HOSPADM

## 2019-05-03 RX ORDER — MIDAZOLAM HYDROCHLORIDE 1 MG/ML
INJECTION, SOLUTION INTRAMUSCULAR; INTRAVENOUS AS NEEDED
Status: DISCONTINUED | OUTPATIENT
Start: 2019-05-03 | End: 2019-05-03 | Stop reason: HOSPADM

## 2019-05-03 RX ORDER — DEXAMETHASONE SODIUM PHOSPHATE 4 MG/ML
INJECTION, SOLUTION INTRA-ARTICULAR; INTRALESIONAL; INTRAMUSCULAR; INTRAVENOUS; SOFT TISSUE AS NEEDED
Status: DISCONTINUED | OUTPATIENT
Start: 2019-05-03 | End: 2019-05-03 | Stop reason: HOSPADM

## 2019-05-03 RX ORDER — SODIUM CHLORIDE 0.9 % (FLUSH) 0.9 %
5-40 SYRINGE (ML) INJECTION EVERY 8 HOURS
Status: DISCONTINUED | OUTPATIENT
Start: 2019-05-03 | End: 2019-05-04 | Stop reason: HOSPADM

## 2019-05-03 RX ORDER — INSULIN LISPRO 100 [IU]/ML
INJECTION, SOLUTION INTRAVENOUS; SUBCUTANEOUS ONCE
Status: DISCONTINUED | OUTPATIENT
Start: 2019-05-03 | End: 2019-05-04 | Stop reason: HOSPADM

## 2019-05-03 RX ORDER — SODIUM CHLORIDE 0.9 % (FLUSH) 0.9 %
5-40 SYRINGE (ML) INJECTION EVERY 8 HOURS
Status: DISCONTINUED | OUTPATIENT
Start: 2019-05-03 | End: 2019-05-03 | Stop reason: HOSPADM

## 2019-05-03 RX ORDER — PROPOFOL 10 MG/ML
INJECTION, EMULSION INTRAVENOUS AS NEEDED
Status: DISCONTINUED | OUTPATIENT
Start: 2019-05-03 | End: 2019-05-03 | Stop reason: HOSPADM

## 2019-05-03 RX ORDER — LIDOCAINE HYDROCHLORIDE 10 MG/ML
0.1 INJECTION, SOLUTION EPIDURAL; INFILTRATION; INTRACAUDAL; PERINEURAL AS NEEDED
Status: DISCONTINUED | OUTPATIENT
Start: 2019-05-03 | End: 2019-05-03 | Stop reason: HOSPADM

## 2019-05-03 RX ORDER — PHENAZOPYRIDINE HYDROCHLORIDE 100 MG/1
200 TABLET, FILM COATED ORAL
Status: COMPLETED | OUTPATIENT
Start: 2019-05-03 | End: 2019-05-03

## 2019-05-03 RX ADMIN — ONDANSETRON 4 MG: 2 INJECTION INTRAMUSCULAR; INTRAVENOUS at 19:52

## 2019-05-03 RX ADMIN — FENTANYL CITRATE 25 MCG: 50 INJECTION, SOLUTION INTRAMUSCULAR; INTRAVENOUS at 19:39

## 2019-05-03 RX ADMIN — SODIUM CHLORIDE, SODIUM LACTATE, POTASSIUM CHLORIDE, CALCIUM CHLORIDE: 600; 310; 30; 20 INJECTION, SOLUTION INTRAVENOUS at 19:34

## 2019-05-03 RX ADMIN — FENTANYL CITRATE 25 MCG: 50 INJECTION, SOLUTION INTRAMUSCULAR; INTRAVENOUS at 19:57

## 2019-05-03 RX ADMIN — Medication 2 G: at 19:45

## 2019-05-03 RX ADMIN — FENTANYL CITRATE 25 MCG: 50 INJECTION, SOLUTION INTRAMUSCULAR; INTRAVENOUS at 19:50

## 2019-05-03 RX ADMIN — FAMOTIDINE 20 MG: 20 TABLET ORAL at 15:03

## 2019-05-03 RX ADMIN — MIDAZOLAM HYDROCHLORIDE 2 MG: 1 INJECTION, SOLUTION INTRAMUSCULAR; INTRAVENOUS at 19:32

## 2019-05-03 RX ADMIN — DEXAMETHASONE SODIUM PHOSPHATE 4 MG: 4 INJECTION, SOLUTION INTRA-ARTICULAR; INTRALESIONAL; INTRAMUSCULAR; INTRAVENOUS; SOFT TISSUE at 19:52

## 2019-05-03 RX ADMIN — PHENAZOPYRIDINE HYDROCHLORIDE 200 MG: 100 TABLET ORAL at 21:22

## 2019-05-03 RX ADMIN — PROPOFOL 150 MG: 10 INJECTION, EMULSION INTRAVENOUS at 19:39

## 2019-05-03 RX ADMIN — SODIUM CHLORIDE, SODIUM LACTATE, POTASSIUM CHLORIDE, AND CALCIUM CHLORIDE 75 ML/HR: 600; 310; 30; 20 INJECTION, SOLUTION INTRAVENOUS at 15:03

## 2019-05-03 RX ADMIN — FENTANYL CITRATE 25 MCG: 50 INJECTION, SOLUTION INTRAMUSCULAR; INTRAVENOUS at 19:45

## 2019-05-03 RX ADMIN — LIDOCAINE HYDROCHLORIDE 100 MG: 20 INJECTION, SOLUTION EPIDURAL; INFILTRATION; INTRACAUDAL; PERINEURAL at 19:39

## 2019-05-03 NOTE — ANESTHESIA PREPROCEDURE EVALUATION
Anesthetic History No history of anesthetic complications Review of Systems / Medical History Patient summary reviewed and pertinent labs reviewed Pulmonary Within defined limits Neuro/Psych Psychiatric history Comments: Moderate subdural hematoma right side with 5.5mm midline shift 2018  [place] without problems Cardiovascular Hyperlipidemia Exercise tolerance: >4 METS Comments: EKG NSR, CXR no active dx GI/Hepatic/Renal 
Within defined limits Endo/Other Within defined limits Other Findings Comments: HCT 40.4, K 3.3, INR 1.1 Physical Exam 
 
Airway Mallampati: II 
TM Distance: 4 - 6 cm Neck ROM: normal range of motion Mouth opening: Normal 
 
 Cardiovascular Regular rate and rhythm,  S1 and S2 normal,  no murmur, click, rub, or gallop Dental 
No notable dental hx Pulmonary Breath sounds clear to auscultation Abdominal 
GI exam deferred Other Findings Anesthetic Plan ASA: 3, emergent Anesthesia type: general 
 
 
 
 
Induction: Intravenous Anesthetic plan and risks discussed with: Patient

## 2019-05-03 NOTE — H&P
Carlyle Miguelten 77 y.o. female      Ms. Dafne Paredes seen today for evaluation of kidney stone disease presenting initially to the emergency room at [facility] in December 2018 complaining of colicky right flank pain finding a 5 mm stone obstructing the proximal right ureter on CT scan imaging-patient has experienced several episodes of colicky pain on the right side with a follow-up CT scan imaging on 22 March 2019 showing a 5 mm stone in the distal right ureter     CT scan imaging of the abdomen and pelvis reviewed on CD today-3 separate CT scan studies in December 2018 and March 2019 show a 5 mm stone progressing down the right ureter causing right-sided hydronephrosis-no sign of stone densities in either kidney nor the distribution of the left ureter-images have been reviewed with the patient on CD-laser printing images not allowed by disc operating system        Review of Systems:   CNS: No seizure syncope headaches  or visual changes/depression  Respiratory: No wheezing shortness of breath chest pain or coughing  Cardiovascular: No palpitations no angina  Intestinal GERD No diarrhea or constipation  Urinary: Kidney stones no history of UTIs no gross hematuria no dysuria  Skeletal: No bone or joint pain  Endocrine: No diabetes no thyroid disease  Other:     Allergies: Allergies   Allergen Reactions    Darvon [Propoxyphene] Vertigo    Sulfa (Sulfonamide Antibiotics) Rash      Medications:           Current Outpatient Medications   Medication Sig Dispense Refill    ibuprofen (MOTRIN) 800 mg tablet Take  by mouth.        tamsulosin (FLOMAX) 0.4 mg capsule Take 1 Cap by mouth daily.  15 Cap 0    meclizine (ANTIVERT) 25 mg tablet Take 1-2 Tabs by mouth as needed.   6    memantine (NAMENDA) 5 mg tablet Take 1 Tab by mouth two (2) times a day.   6    amitriptyline (ELAVIL) 25 mg tablet Take 25 mg by mouth nightly.        ondansetron (ZOFRAN ODT) 4 mg disintegrating tablet Take 1 Tab by mouth every eight (8) hours as needed for Nausea.  12 Tab 0    methylphenidate HCl (RITALIN) 10 mg tablet Take 10 mg by mouth daily.                  Past Medical History:   Diagnosis Date    Anxiety      Back problem      Constipation      Depression      Depression      Heartburn      High cholesterol      Kidney stone      Neurological disorder       Hematoma removal head    Poor appetite      Sleep trouble      Tiredness      Vertigo              Past Surgical History:   Procedure Laterality Date    HX  SECTION        HX GYN   0     Surgery for ectopic pregnancy    HX OTHER SURGICAL   2018     \"cauterized brain bleed, and drained blood\" [facility]    HX TONSILLECTOMY          Social History            Socioeconomic History    Marital status:        Spouse name: Not on file    Number of children: Not on file    Years of education: Not on file    Highest education level: Not on file   Occupational History    Not on file   Social Needs    Financial resource strain: Not on file    Food insecurity:       Worry: Not on file       Inability: Not on file    Transportation needs:       Medical: Not on file       Non-medical: Not on file   Tobacco Use    Smoking status: Current Every Day Smoker       Packs/day: 0.25    Smokeless tobacco: Never Used   Substance and Sexual Activity    Alcohol use: No    Drug use: No    Sexual activity: Never   Lifestyle    Physical activity:       Days per week: Not on file       Minutes per session: Not on file    Stress: Not on file   Relationships    Social connections:       Talks on phone: Not on file       Gets together: Not on file       Attends Nondenominational service: Not on file       Active member of club or organization: Not on file       Attends meetings of clubs or organizations: Not on file       Relationship status: Not on file    Intimate partner violence:       Fear of current or ex partner: Not on file       Emotionally abused: Not on file       Physically abused: Not on file       Forced sexual activity: Not on file   Other Topics Concern     Service Not Asked    Blood Transfusions Not Asked    Caffeine Concern Not Asked    Occupational Exposure Not Asked    Hobby Hazards Not Asked    Sleep Concern Not Asked    Stress Concern Not Asked    Weight Concern Not Asked    Special Diet Not Asked    Back Care Not Asked    Exercise Not Asked    Bike Helmet Not Asked   [address] [facility] Not Asked    Self-Exams Not Asked   Social History Narrative    Not on file            Family History   Problem Relation Age of Onset    Diabetes Father      Heart Disease Father      Cancer Maternal Grandmother           Physical Examination: Well-nourished mature female in no apparent distress  Neck: No masses nodes or bruits  Lungs: Clear breath sounds bilaterally no wheezes rales or rhonchi  Heart: Regular sinus rhythm no murmurs no gallops no rubs  Abdomen: Nontender no palpable masses no organomegaly no bruits  Back: No percussion CVA tenderness  Skin: Warm and dry no rash or lesions  Neurologic: No motor or sensory deficits in arms or legs         Urinalysis: +heme/negative nitrite     Impression: 5 mm right ureteral stone in transit x5 months           Plan: Right sided uteroscopic laser lithotripsy with stent placement     Kidney stone precautions     Counseling Note     Indications for an technique of ureteroscopic laser lithotripsy with stent placement have been described discussed with patient who understands and accepts the risk of bleeding, infection, obstruction, as well as discomfort for the presence of an indwelling stent postoperatively            Nita Mcclelland MD

## 2019-05-04 NOTE — ANESTHESIA POSTPROCEDURE EVALUATION
Procedure(s): RIGHT URETEROSCOPY/HOLMIUM LASER LITHOTRIPSY/STENT/C-ARM. general 
 
Anesthesia Post Evaluation Multimodal analgesia: multimodal analgesia used between 6 hours prior to anesthesia start to PACU discharge Patient location during evaluation: PACU Patient participation: complete - patient participated Level of consciousness: sleepy but conscious Pain management: satisfactory to patient Airway patency: patent Anesthetic complications: no 
Cardiovascular status: acceptable Respiratory status: acceptable Hydration status: acceptable Post anesthesia nausea and vomiting:  none Vitals Value Taken Time /75 5/3/2019  8:15 PM  
Temp 36.7 °C (98 °F) 5/3/2019  8:15 PM  
Pulse 101 5/3/2019  8:16 PM  
Resp 10 5/3/2019  8:16 PM  
SpO2 100 % 5/3/2019  8:16 PM  
Vitals shown include unvalidated device data.

## 2019-05-04 NOTE — OP NOTES
[facility]  OPERATIVE REPORT    Name:  Clementina Fabry  MR#:   [id number]  :  1950  ACCOUNT #:  [de-identified]  DATE OF SERVICE:  2019    PREOPERATIVE DIAGNOSIS:  Right urinary tract stone. POSTOPERATIVE DIAGNOSIS:  Right urinary tract stone. PROCEDURE PERFORMED:  Cystoscopy with right retrograde ureteropyelography, right ureteroscopy, right ureteral stent placement. SURGEON:  Cici Gordon MD.    ASSISTANT:  None. ANESTHESIA: General by LMA    COMPLICATIONS:  None. SPECIMENS REMOVED:  None. IMPLANTS: None    ESTIMATED BLOOD LOSS:  None. TUBES AND DRAINS:  A 6-Solomon Islander x 22cm double pigtail stent, tethered. INDICATIONS FOR OPERATION:  A 80-year-old female experiencing intermittent colicky flank pain since 2018, finding a 5-mm stone obstructing the ureteropelvic junction on the right side when imaged with CT scan imaging in the emergency room at [facility].  Two subsequent presentations for intolerable flank pain prompted CT scan imaging showing the stone making progress in its transit of the right urinary tract, finding the stone in the proximal ureter, and later in late March, early April in the distal ureter. The patient has had 4 months of intermittent renal colic and has no longer patience for tolerating the prospect of spontaneous passage. She was, therefore, brought to the operating room for endoscopic removal of the symptomatic right kidney stone. There is no history of recurrent kidney stones, no history of urinary tract infections. DESCRIPTION OF THE OPERATION:  After establishing adequate general anesthesia by LMA, the patient was placed in a dorsal lithotomy position with legs suspended in the Myron stirrups, cushioned with soft foam rubber padding. The external genitalia were then prepped with antibiotic scrub and solution, and draped in sterile manner.   A 20-Solomon Islander cystoscope was passed under direct vision and inspection of the urethra and bladder showed normal findings. The right ureteral orifice was somewhat patulous, but no stone material was evident in the intramural tunnel nor in the fundus of the bladder. The cystoscope was removed and replaced with a 7-Niuean rigid ureteroscope, which was advanced into the right ureteral orifice after passing a Glidewire through the working channel into the orifice and allowing it to advance into the right renal collecting system. The ureteroscope was passed over the Glidewire, easily traversing the intramural tunnel and finding no stone material in the tunnel or distal ureter. The ureteroscope was slowly advanced all the way to the renal pelvis where visualization showed no stones or stone fragments at that level of the urinary tract. The rigid ureteroscope was removed leaving the Glidewire in place. A 12-14 Niuean ureteral access sheath was easily passed over the Glidewire and a 7-Niuean flexible ureteroscope was passed through the 12-Niuean access sheath allowing a thorough visual inspection of the right collecting system, which showed no retained stone fragments. There were no Jose Daniel plaques evident on the papillae, although which showed normal anatomical features. The contrast solution was injected through the working channel of the ureteroscope and passed via the right collecting system, which showed no filling defects and no extravasation. A Glidewire was put back in place and the access sheath and ureteroscope were removed. A 6-Niuean x 24 cm double pigtail stent was passed over the Glidewire, position showed the proximal coil formed within the renal pelvis and distal coil formed within the lumen of the bladder. A nylon tether attached to the bladder coil was brought out the urethra and fixed to the suprapubic area with a Tegaderm tape. The procedure overall was uncomplicated and well tolerated.   The patient was taken from the operating room to the PACU in good condition.       Maurisio Mcdaniel MD      [id number]  D:  05/03/2019 20:55  T:  05/04/2019 0:12  JOB #:  [id number]

## 2019-05-04 NOTE — DISCHARGE INSTRUCTIONS
Light activity  Reg diet  Rx Septra, Colace, Pyridium    rtc 1 week      Matt Camargo MD    DISCHARGE SUMMARY from Nurse    PATIENT INSTRUCTIONS:    After general anesthesia or intravenous sedation, for 24 hours or while taking prescription Narcotics:  · Limit your activities  · Do not drive and operate hazardous machinery  · Do not make important personal or business decisions  · Do  not drink alcoholic beverages  · If you have not urinated within 8 hours after discharge, please contact your surgeon on call. Report the following to your surgeon:  · Excessive pain, swelling, redness or odor of or around the surgical area  · Temperature over 100.5  · Nausea and vomiting lasting longer than 4 hours or if unable to take medications  · Any signs of decreased circulation or nerve impairment to extremity: change in color, persistent  numbness, tingling, coldness or increase pain  · Any questions    What to do at Home:  Recommended activity: Activity as tolerated and no driving for today. *  Please give a list of your current medications to your Primary Care Provider. *  Please update this list whenever your medications are discontinued, doses are      changed, or new medications (including over-the-counter products) are added. *  Please carry medication information at all times in case of emergency situations. These are general instructions for a healthy lifestyle:    No smoking/ No tobacco products/ Avoid exposure to second hand smoke  Surgeon General's Warning:  Quitting smoking now greatly reduces serious risk to your health.     Obesity, smoking, and sedentary lifestyle greatly increases your risk for illness    A healthy diet, regular physical exercise & weight monitoring are important for maintaining a healthy lifestyle    You may be retaining fluid if you have a history of heart failure or if you experience any of the following symptoms:  Weight gain of 3 pounds or more overnight or 5 pounds in a week, increased swelling in our hands or feet or shortness of breath while lying flat in bed. Please call your doctor as soon as you notice any of these symptoms; do not wait until your next office visit. Recognize signs and symptoms of STROKE:    F-face looks uneven    A-arms unable to move or move unevenly    S-speech slurred or non-existent    T-time-call 911 as soon as signs and symptoms begin-DO NOT go       Back to bed or wait to see if you get better-TIME IS BRAIN. Warning Signs of HEART ATTACK     Call 911 if you have these symptoms:   Chest discomfort. Most heart attacks involve discomfort in the center of the chest that lasts more than a few minutes, or that goes away and comes back. It can feel like uncomfortable pressure, squeezing, fullness, or pain.  Discomfort in other areas of the upper body. Symptoms can include pain or discomfort in one or both arms, the back, neck, jaw, or stomach.  Shortness of breath with or without chest discomfort.  Other signs may include breaking out in a cold sweat, nausea, or lightheadedness. Don't wait more than five minutes to call 911 - MINUTES MATTER! Fast action can save your life. Calling 911 is almost always the fastest way to get lifesaving treatment. Emergency Medical Services staff can begin treatment when they arrive -- up to an hour sooner than if someone gets to the hospital by car. The discharge information has been reviewed with the patient. The patient verbalized understanding. Discharge medications reviewed with the patient and appropriate educational materials and side effects teaching were provided. Patient Education        Ureteral Stent Placement: What to Expect at Home  Your Recovery    A ureteral (say \"you-REE-ter-ul\") stent is a thin, hollow tube that is placed in the ureter to help urine pass from the kidney into the bladder. Ureters are the tubes that connect the kidneys to the bladder.   You may have a small amount of blood in your urine for 1 to 3 days after the procedure. While the stent is in place, you may have to urinate more often, feel a sudden need to urinate, or feel like you cannot completely empty your bladder. You may feel some pain when you urinate or do strenuous activity. You also may notice a small amount of blood in your urine after strenuous activities. These side effects usually do not prevent people from doing their normal daily activities. You may have a thin string coming out of your urethra. Your urethra is the tube that carries urine from your bladder to outside your body. This string is attached to the stent. Try not to pull on the string. The doctor will use the string to pull out the stent when you no longer need it. After the procedure, urine may flow better from your kidneys to your bladder. A ureteral stent may be left in place for several days or for as long as several months. Your doctor will take it out when you no longer need it. This care sheet gives you a general idea about how long it will take for you to recover. But each person recovers at a different pace. Follow the steps below to get better as quickly as possible. How can you care for yourself at home? Activity    · Rest when you feel tired. Getting enough sleep will help you recover.     · Avoid strenuous activities, such as bicycle riding, jogging, weight lifting, or aerobic exercise, until your doctor says it is okay.     · Ask your doctor when you can drive again.     · Most people are able to return to work the day after the procedure. If your work requires intense activity, you may feel pain in your kidney area or get tired easily. If this happens, you may need to do less strenuous activities while the stent is in.     · Ask your doctor when it is okay for you to have sex. Diet    · You can eat your normal diet.  If your stomach is upset, try bland, low-fat foods like plain rice, broiled chicken, toast, and yogurt.     · Drink plenty of fluids (unless your doctor tells you not to). Medicines    · Your doctor will tell you if and when you can restart your medicines. He or she will also give you instructions about taking any new medicines.     · If you take blood thinners, such as warfarin (Coumadin), clopidogrel (Plavix), or aspirin, be sure to talk to your doctor. He or she will tell you if and when to start taking those medicines again. Make sure that you understand exactly what your doctor wants you to do.     · Be safe with medicines. Take pain medicines exactly as directed. ? If the doctor gave you a prescription medicine for pain, take it as prescribed. ? If you are not taking a prescription pain medicine, ask your doctor if you can take an over-the-counter medicine.     · If you think your pain medicine is making you sick to your stomach:  ? Take your medicine after meals (unless your doctor has told you not to). ? Ask your doctor for a different pain medicine.     · If your doctor prescribed antibiotics, take them as directed. Do not stop taking them just because you feel better. You need to take the full course of antibiotics. Follow-up care is a key part of your treatment and safety. Be sure to make and go to all appointments, and call your doctor if you are having problems. It's also a good idea to know your test results and keep a list of the medicines you take. When should you call for help? Call 911 anytime you think you may need emergency care. For example, call if:    · You passed out (lost consciousness).     · You have severe trouble breathing.     · You have sudden chest pain and shortness of breath, or you cough up blood.     · You have severe belly pain.    Call your doctor now or seek immediate medical care if:    · Part or all of the stent comes out of your urethra.     · You have pain that does not get better after you take pain medicine.     · You have symptoms of a urinary infection.  For example:  ? You have blood or pus in your urine. ? You have pain in your back just below your rib cage. This is called flank pain. ? You have a fever, chills, or body aches. ? It hurts to urinate. ? You have groin or belly pain.     · You cannot control when you urinate, or you leak urine.    Watch closely for changes in your health, and be sure to contact your doctor if you have any problems. Where can you learn more? Go to http://kristofer-dori.info/. Enter G953 in the search box to learn more about \"Ureteral Stent Placement: What to Expect at Home. \"  Current as of: March 20, 2018  Content Version: 11.9  © 7740-1877 [employer]. Care instructions adapted under license by [employer] (which disclaims liability or warranty for this information). If you have questions about a medical condition or this instruction, always ask your healthcare professional. [place] [address] any warranty or liability for your use of this information.        ___________________________________________________________________________________________________________________________________

## 2019-05-04 NOTE — BRIEF OP NOTE
BRIEF OPERATIVE NOTE    Date of Procedure: 5/3/2019   Preoperative Diagnosis: Kidney stone [N20.0] 5 mm distal right ureteral stone  Postoperative Diagnosis:gabriel- passed spontaneously  Procedure(s):  RIGHT URETEROSCOPY/cystoscopy/ reterograde pyelography/right ureteral stent placement  Surgeon(s) and Role:      Chuck Keane MD - Primary         Surgical Assistant:     Surgical Staff:  Circ-1: Jina Rizo RN  : Olivia Bear  Radiology Technician: Trinidad Russell  Scrub Tech-1: Olivia GARCES  Event Time In Time Out   Incision Start 1952    Incision Close 2006      Anesthesia: General   Estimated Blood Loss: none  Specimens: none  Findings: no stone in right ureter or kidney/ negative ureteroscopy  Complications: none  Implants:   Implant Name Type Inv.  Item Serial No.  Lot No. LRB No. Used Action   Delfin Barthel - [id number]  Julia Driver [id number] -- PERCUFLEX  [facility] [id number] Right 1 Implanted     No [place] La Torre    6 Fr x 24 cm tethered stent      MD Lorenzo Dunbar MD

## 2019-05-04 NOTE — DISCHARGE SUMMARY
Right retrograde imaging with ureteroscopy and stent placement under gen anes by Iman Wong- nathalia Sifuentes MD

## 2019-05-06 ENCOUNTER — OFFICE VISIT (OUTPATIENT)
Dept: UROLOGY | Age: 69
End: 2019-05-06

## 2019-05-06 VITALS
SYSTOLIC BLOOD PRESSURE: 90 MMHG | HEART RATE: 106 BPM | BODY MASS INDEX: 34.23 KG/M2 | OXYGEN SATURATION: 96 % | DIASTOLIC BLOOD PRESSURE: 60 MMHG | HEIGHT: 62 IN | WEIGHT: 186 LBS

## 2019-05-06 DIAGNOSIS — N20.0 KIDNEY STONES: Primary | ICD-10-CM

## 2019-05-06 RX ORDER — OXYCODONE HYDROCHLORIDE 5 MG/1
5 TABLET ORAL
Qty: 18 TAB | Refills: 0 | Status: SHIPPED | OUTPATIENT
Start: 2019-05-06 | End: 2019-05-09

## 2019-05-06 RX ORDER — HYDROCODONE BITARTRATE AND ACETAMINOPHEN 5; 325 MG/1; MG/1
TABLET ORAL
COMMUNITY
End: 2022-04-11

## 2019-05-07 NOTE — PROGRESS NOTES
Carlyle Cottrell 77 y.o. female     Ms. Dafne Paredes seen today for dilation of symptomatic right ureteral stent put in place at time of ureteroscopy on 3 May 2019 finding spontaneous passage of a 4 mm right ureteral stone  Patient has no complaint of fever chills or dysuria-complains of right flank pain especially when voiding urine    Patient has history of kidney stone disease presenting initially to the emergency room at [facility] in December 2018 complaining of colicky right flank pain finding a 5 mm stone obstructing the proximal right ureter on CT scan imaging-patient has experienced several episodes of colicky pain on the right side with a follow-up CT scan imaging on 22 March 2019 showing a 5 mm stone in the distal right ureter     CT scan imaging of the abdomen and pelvis reviewed on CD -3 separate CT scan studies in December 2018 and March 2019 show a 5 mm stone progressing down the right ureter causing right-sided hydronephrosis-no sign of stone densities in either kidney nor the distribution of the left ureter-images have been reviewed with the patient on CD-laser printing images not allowed by disc operating system        Review of Systems:   CNS: No seizure syncope headaches  or visual changes/depression  Respiratory: No wheezing shortness of breath chest pain or coughing  Cardiovascular: No palpitations no angina  Intestinal GERD No diarrhea or constipation  Urinary: Kidney stones no history of UTIs no gross hematuria no dysuria  Skeletal: No bone or joint pain  Endocrine: No diabetes no thyroid disease  Other:    Allergies: Allergies   Allergen Reactions    Darvon [Propoxyphene] Vertigo    Sulfa (Sulfonamide Antibiotics) Rash      Medications:    Current Outpatient Medications   Medication Sig Dispense Refill    HYDROcodone-acetaminophen (NORCO) 5-325 mg per tablet Take  by mouth.       oxyCODONE IR (ROXICODONE) 5 mg immediate release tablet Take 1 Tab by mouth every four (4) hours as needed for Pain for up to 3 days. Max Daily Amount: 30 mg. 18 Tab 0    ciprofloxacin HCl (CIPRO) 250 mg tablet Take 1 Tab by mouth daily for 10 days. 10 Tab 0    meclizine (ANTIVERT) 25 mg tablet Take 1-2 Tabs by mouth as needed. 6    memantine (NAMENDA) 5 mg tablet Take 1 Tab by mouth two (2) times a day. 6    amitriptyline (ELAVIL) 25 mg tablet Take 25 mg by mouth nightly.  docusate sodium (COLACE) 100 mg capsule Take 1 Cap by mouth two (2) times a day for 5 days. 10 Cap 2    phenazopyridine (PYRIDIUM) 200 mg tablet Take 1 Tab by mouth three (3) times daily as needed for Pain for up to 3 days. 30 Tab 3    ibuprofen (MOTRIN) 800 mg tablet Take  by mouth.  tamsulosin (FLOMAX) 0.4 mg capsule Take 1 Cap by mouth daily. 15 Cap 0    ondansetron (ZOFRAN ODT) 4 mg disintegrating tablet Take 1 Tab by mouth every eight (8) hours as needed for Nausea. 12 Tab 0    methylphenidate HCl (RITALIN) 10 mg tablet Take 10 mg by mouth daily.          Past Medical History:   Diagnosis Date    Anxiety     Back problem     Constipation     Depression     Depression     Heartburn     High cholesterol     Kidney stone     Neurological disorder     Hematoma removal head    Poor appetite     Sleep trouble     Tiredness     Vertigo       Past Surgical History:   Procedure Laterality Date    HX  SECTION      [place] 14    Surgery for ectopic pregnancy    HX OTHER SURGICAL  2018    \"cauterized brain bleed, and drained blood\" per patient at [facility]    [address] Marital status:      Spouse name: Not on file    Number of children: Not on file    Years of education: Not on file    Highest education level: Not on file   Occupational History    Not on file   Social Needs    Financial resource strain: Not on file    Food insecurity:     Worry: Not on file     Inability: Not on file    Transportation needs:     Medical: Not on file     Non-medical: Not on file   Tobacco Use    Smoking status: Current Every Day Smoker     Packs/day: 0.25    Smokeless tobacco: Never Used   Substance and Sexual Activity    Alcohol use: No    Drug use: No    Sexual activity: Never   Lifestyle    Physical activity:     Days per week: Not on file     Minutes per session: Not on file    Stress: Not on file   Relationships    Social connections:     Talks on phone: Not on file     Gets together: Not on file     Attends Yarsani service: Not on file     Active member of club or organization: Not on file     Attends meetings of clubs or organizations: Not on file     Relationship status: Not on file    Intimate partner violence:     Fear of current or ex partner: Not on file     Emotionally abused: Not on file     Physically abused: Not on file     Forced sexual activity: Not on file   Other Topics Concern     Service Not Asked    Blood Transfusions Not Asked    Caffeine Concern Not Asked    Occupational Exposure Not Asked    Hobby Hazards Not Asked    Sleep Concern Not Asked    Stress Concern Not Asked    Weight Concern Not Asked    Special Diet Not Asked    Back Care Not Asked    Exercise Not Asked    Bike Helmet Not Asked    Seat Belt Not Asked    Self-Exams Not Asked   Social History Narrative    Not on file      Family History   Problem Relation Age of Onset    Diabetes Father     Heart Disease Father     Cancer Maternal Grandmother         Physical Examination: Nourished mature female mildly distressed with right flank pain         Impression: Stent colic status post flexible right ureteroscopy on 3 May 2019    Plan: Oxycodone 5 mg every 6 hours as needed pain #12    RTC 1 week with stent removal      More than 1/2 of this 15 minute visit was spent in counselling and coordination of care, as described above.   Munir Bender MD  -electronically signed-    PLEASE NOTE:  This document has been produced using voice recognition software. Unrecognized errors in transcription may be present.

## 2019-05-09 ENCOUNTER — OFFICE VISIT (OUTPATIENT)
Dept: UROLOGY | Age: 69
End: 2019-05-09

## 2019-05-09 VITALS
BODY MASS INDEX: 34.23 KG/M2 | HEIGHT: 62 IN | WEIGHT: 186 LBS | SYSTOLIC BLOOD PRESSURE: 129 MMHG | HEART RATE: 114 BPM | DIASTOLIC BLOOD PRESSURE: 84 MMHG | OXYGEN SATURATION: 97 %

## 2019-05-09 DIAGNOSIS — N20.0 KIDNEY STONE: Primary | ICD-10-CM

## 2019-05-09 NOTE — PROGRESS NOTES
Ms. Cabello Patient has a reminder for a \"due or due soon\" health maintenance. I have asked that she contact her primary care provider for follow-up on this health maintenance. RBV per Dr Nolan Roberts removed stent with no complications. Patient tolereted well.

## 2019-05-09 NOTE — PATIENT INSTRUCTIONS
Kidney Stone: Care Instructions  Your Care Instructions    Kidney stones are formed when salts, minerals, and other substances normally found in the urine clump together. They can be as small as grains of sand or, rarely, as large as golf balls. While the stone is traveling through the ureter, which is the tube that carries urine from the kidney to the bladder, you will probably feel pain. The pain may be mild or very severe. You may also have some blood in your urine. As soon as the stone reaches the bladder, any intense pain should go away. If a stone is too large to pass on its own, you may need a medical procedure to help you pass the stone. The doctor has checked you carefully, but problems can develop later. If you notice any problems or new symptoms, get medical treatment right away. Follow-up care is a key part of your treatment and safety. Be sure to make and go to all appointments, and call your doctor if you are having problems. It's also a good idea to know your test results and keep a list of the medicines you take. How can you care for yourself at home? · Drink plenty of fluids, enough so that your urine is light yellow or clear like water. If you have kidney, heart, or liver disease and have to limit fluids, talk with your doctor before you increase the amount of fluids you drink. · Take pain medicines exactly as directed. Call your doctor if you think you are having a problem with your medicine. ? If the doctor gave you a prescription medicine for pain, take it as prescribed. ? If you are not taking a prescription pain medicine, ask your doctor if you can take an over-the-counter medicine. Read and follow all instructions on the label. · Your doctor may ask you to strain your urine so that you can collect your kidney stone when it passes. You can use a kitchen strainer or a tea strainer to catch the stone. Store it in a plastic bag until you see your doctor again.   Preventing future kidney stones  Some changes in your diet may help prevent kidney stones. Depending on the cause of your stones, your doctor may recommend that you:  · Drink plenty of fluids, enough so that your urine is light yellow or clear like water. If you have kidney, heart, or liver disease and have to limit fluids, talk with your doctor before you increase the amount of fluids you drink. · Limit coffee, tea, and alcohol. Also avoid grapefruit juice. · Do not take more than the recommended daily dose of vitamins C and D.  · Avoid antacids such as Gaviscon, Maalox, Mylanta, or Tums. · Limit the amount of salt (sodium) in your diet. · Eat a balanced diet that is not too high in protein. · Limit foods that are high in a substance called oxalate, which can cause kidney stones. These foods include dark green vegetables, rhubarb, chocolate, wheat bran, nuts, cranberries, and beans. When should you call for help? Call your doctor now or seek immediate medical care if:    · You cannot keep down fluids.     · Your pain gets worse.     · You have a fever or chills.     · You have new or worse pain in your back just below your rib cage (the flank area).     · You have new or more blood in your urine.    Watch closely for changes in your health, and be sure to contact your doctor if:    · You do not get better as expected. Where can you learn more? Go to http://kristofer-dori.info/. Enter L708 in the search box to learn more about \"Kidney Stone: Care Instructions. \"  Current as of: March 14, 2018  Content Version: 11.9  © 7842-6354 [employer]. Care instructions adapted under license by [employer] (which disclaims liability or warranty for this information). If you have questions about a medical condition or this instruction, always ask your healthcare professional. [address] any warranty or liability for your use of this information.

## 2019-06-06 ENCOUNTER — OFFICE VISIT (OUTPATIENT)
Dept: UROLOGY | Age: 69
End: 2019-06-06

## 2019-06-06 VITALS
HEIGHT: 62 IN | OXYGEN SATURATION: 96 % | SYSTOLIC BLOOD PRESSURE: 119 MMHG | HEART RATE: 113 BPM | BODY MASS INDEX: 34.02 KG/M2 | DIASTOLIC BLOOD PRESSURE: 67 MMHG

## 2019-06-06 DIAGNOSIS — N20.0 KIDNEY STONE: Primary | ICD-10-CM

## 2019-06-06 LAB
BILIRUB UR QL STRIP: NEGATIVE
GLUCOSE UR-MCNC: NEGATIVE MG/DL
KETONES P FAST UR STRIP-MCNC: NORMAL MG/DL
PH UR STRIP: 5.5 [PH] (ref 4.6–8)
PROT UR QL STRIP: NEGATIVE
SP GR UR STRIP: 1.02 (ref 1–1.03)
UA UROBILINOGEN AMB POC: NORMAL (ref 0.2–1)
URINALYSIS CLARITY POC: CLEAR
URINALYSIS COLOR POC: YELLOW
URINE BLOOD POC: NORMAL
URINE LEUKOCYTES POC: NORMAL
URINE NITRITES POC: NEGATIVE

## 2019-06-06 RX ORDER — SERTRALINE HYDROCHLORIDE 25 MG/1
150 TABLET, FILM COATED ORAL DAILY
Refills: 0 | COMMUNITY
Start: 2019-05-14

## 2019-06-06 NOTE — PROGRESS NOTES
Ms. Andi Puente has a reminder for a \"due or due soon\" health maintenance. I have asked that she contact her primary care provider for follow-up on this health maintenance.

## 2019-06-06 NOTE — PATIENT INSTRUCTIONS
Kidney Stone: Care Instructions  Your Care Instructions    Kidney stones are formed when salts, minerals, and other substances normally found in the urine clump together. They can be as small as grains of sand or, rarely, as large as golf balls. While the stone is traveling through the ureter, which is the tube that carries urine from the kidney to the bladder, you will probably feel pain. The pain may be mild or very severe. You may also have some blood in your urine. As soon as the stone reaches the bladder, any intense pain should go away. If a stone is too large to pass on its own, you may need a medical procedure to help you pass the stone. The doctor has checked you carefully, but problems can develop later. If you notice any problems or new symptoms, get medical treatment right away. Follow-up care is a key part of your treatment and safety. Be sure to make and go to all appointments, and call your doctor if you are having problems. It's also a good idea to know your test results and keep a list of the medicines you take. How can you care for yourself at home? · Drink plenty of fluids, enough so that your urine is light yellow or clear like water. If you have kidney, heart, or liver disease and have to limit fluids, talk with your doctor before you increase the amount of fluids you drink. · Take pain medicines exactly as directed. Call your doctor if you think you are having a problem with your medicine. ? If the doctor gave you a prescription medicine for pain, take it as prescribed. ? If you are not taking a prescription pain medicine, ask your doctor if you can take an over-the-counter medicine. Read and follow all instructions on the label. · Your doctor may ask you to strain your urine so that you can collect your kidney stone when it passes. You can use a kitchen strainer or a tea strainer to catch the stone. Store it in a plastic bag until you see your doctor again.   Preventing future kidney stones  Some changes in your diet may help prevent kidney stones. Depending on the cause of your stones, your doctor may recommend that you:  · Drink plenty of fluids, enough so that your urine is light yellow or clear like water. If you have kidney, heart, or liver disease and have to limit fluids, talk with your doctor before you increase the amount of fluids you drink. · Limit coffee, tea, and alcohol. Also avoid grapefruit juice. · Do not take more than the recommended daily dose of vitamins C and D.  · Avoid antacids such as Gaviscon, Maalox, Mylanta, or Tums. · Limit the amount of salt (sodium) in your diet. · Eat a balanced diet that is not too high in protein. · Limit foods that are high in a substance called oxalate, which can cause kidney stones. These foods include dark green vegetables, rhubarb, chocolate, wheat bran, nuts, cranberries, and beans. When should you call for help? Call your doctor now or seek immediate medical care if:    · You cannot keep down fluids.     · Your pain gets worse.     · You have a fever or chills.     · You have new or worse pain in your back just below your rib cage (the flank area).     · You have new or more blood in your urine.    Watch closely for changes in your health, and be sure to contact your doctor if:    · You do not get better as expected. Where can you learn more? Go to http://kristofer-dori.info/. Enter U969 in the search box to learn more about \"Kidney Stone: Care Instructions. \"  Current as of: March 14, 2018  Content Version: 11.9  © 1476-4503 [employer]. Care instructions adapted under license by [employer] (which disclaims liability or warranty for this information). If you have questions about a medical condition or this instruction, always ask your healthcare professional. [address] any warranty or liability for your use of this information.

## 2019-06-07 NOTE — PROGRESS NOTES
Rosalva Lombardo 77 y.o. female     Ms. America Wagner seen today for kidney stone follow-up here today for renal ultrasound imaging following endoscopic stone removal on 3 May 2019  Patient is asymptomatic at this time voiding well-no complaints    kidney stone disease presenting initially to the emergency room at [facility] in December 2018 complaining of colicky right flank pain finding a 5 mm stone obstructing the proximal right ureter on CT scan imaging-patient has experienced several episodes of colicky pain on the right side with a follow-up CT scan imaging on 22 March 2019 showing a 5 mm stone in the distal right ureter     CT scan imaging of the abdomen and pelvis reviewed on CD today-3 separate CT scan studies in December 2018 and March 2019 show a 5 mm stone progressing down the right ureter causing right-sided hydronephrosis-no sign of stone densities in either kidney nor the distribution of the left ureter-images have been reviewed with the patient on CD-laser printing images not allowed by disc operating system     Review of Systems:   CNS: No seizure syncope headaches  or visual changes/depression  Respiratory: No wheezing shortness of breath chest pain or coughing  Cardiovascular: No palpitations no angina  Intestinal GERD No diarrhea or constipation  Urinary: Kidney stones no history of UTIs no gross hematuria no dysuria  Skeletal: No bone or joint pain  Endocrine: No diabetes no thyroid disease  Other:     Allergies: Allergies   Allergen Reactions    Darvon [Propoxyphene] Vertigo    Sulfa (Sulfonamide Antibiotics) Rash      Medications:    Current Outpatient Medications   Medication Sig Dispense Refill    sertraline (ZOLOFT) 25 mg tablet TK 1 T PO QAM  0    amitriptyline (ELAVIL) 25 mg tablet Take 25 mg by mouth nightly.  HYDROcodone-acetaminophen (NORCO) 5-325 mg per tablet Take  by mouth.  ibuprofen (MOTRIN) 800 mg tablet Take  by mouth.       tamsulosin (FLOMAX) 0.4 mg capsule Take 1 Cap by mouth daily. 15 Cap 0    ondansetron (ZOFRAN ODT) 4 mg disintegrating tablet Take 1 Tab by mouth every eight (8) hours as needed for Nausea. 12 Tab 0    meclizine (ANTIVERT) 25 mg tablet Take 1-2 Tabs by mouth as needed. 6    memantine (NAMENDA) 5 mg tablet Take 1 Tab by mouth two (2) times a day. 6    methylphenidate HCl (RITALIN) 10 mg tablet Take 10 mg by mouth daily.          Past Medical History:   Diagnosis Date    Anxiety     Back problem     Constipation     Depression     Depression     Heartburn     High cholesterol     Kidney stone     Neurological disorder     Hematoma removal head    Poor appetite     Sleep trouble     Tiredness     Vertigo       Past Surgical History:   Procedure Laterality Date    HX  SECTION      [place]wyn 14    Surgery for ectopic pregnancy    HX OTHER SURGICAL  2018    \"cauterized brain bleed, and drained blood\" per patient at [facility]    [address] Marital status:      Spouse name: Not on file    Number of children: Not on file    Years of education: Not on file    Highest education level: Not on file   Occupational History    Not on file   Social Needs    Financial resource strain: Not on file    Food insecurity:     Worry: Not on file     Inability: Not on file    Transportation needs:     Medical: Not on file     Non-medical: Not on file   Tobacco Use    Smoking status: Current Every Day Smoker     Packs/day: 0.25    Smokeless tobacco: Never Used   Substance and Sexual Activity    Alcohol use: No    Drug use: No    Sexual activity: Never   Lifestyle    Physical activity:     Days per week: Not on file     Minutes per session: Not on file    Stress: Not on file   Relationships    Social connections:     Talks on phone: Not on file     Gets together: Not on file     Attends [employer] service: Not on file     Active member of club or organization: Not on file     Attends meetings of clubs or organizations: Not on file     Relationship status: Not on file    Intimate partner violence:     Fear of current or ex partner: Not on file     Emotionally abused: Not on file     Physically abused: Not on file     Forced sexual activity: Not on file   Other Topics Concern     Service Not Asked    Blood Transfusions Not Asked    Caffeine Concern Not Asked    Occupational Exposure Not Asked   [employer] Hazards Not Asked    Sleep Concern Not Asked    Stress Concern Not Asked    Weight Concern Not Asked    Special Diet Not Asked    Back Care Not Asked    Exercise Not Asked    Bike Helmet Not Asked   [address] [facility] Not Asked    Self-Exams Not Asked   Social History Narrative    Not on file      Family History   Problem Relation Age of Onset    Diabetes Father     Heart Disease Father     Cancer Maternal Grandmother         Physical Examination: Well-nourished mature female in no apparent distress    Urinalysis: ++heme/negative nitrite    Ultrasound imaging of the kidneys today shows no signs of obstruction on either side        Impression: Right kidney stone stable and stone free status post endoscopic stone extraction      Plan: Maintain good oral hydration avoid episodes of dehydration    RTC PRN        Nabeel Bhardwaj MD  -electronically signed-    PLEASE NOTE:  This document has been produced using voice recognition software. Unrecognized errors in transcription may be present.

## 2020-07-31 NOTE — PROGRESS NOTES
David No 77 y.o. female     Ms. Justus Rivera seen today for removal of ureteral stent put in place at time of ureteroscopy on 3 May 2019   right ureteral stent put in place at time of ureteroscopy on 3 May 2019 finding spontaneous passage of a 4 mm right ureteral stone  Patient has no complaint of fever chills or dysuria-complains of right flank pain especially when voiding urine     Patient has history of kidney stone disease presenting initially to the emergency room at [facility] in December 2018 complaining of colicky right flank pain finding a 5 mm stone obstructing the proximal right ureter on CT scan imaging-patient has experienced several episodes of colicky pain on the right side with a follow-up CT scan imaging on 22 March 2019 showing a 5 mm stone in the distal right ureter     CT scan imaging of the abdomen and pelvis reviewed on CD -3 separate CT scan studies in December 2018 and March 2019 show a 5 mm stone progressing down the right ureter causing right-sided hydronephrosis-no sign of stone densities in either kidney nor the distribution of the left ureter-images have been reviewed with the patient on CD-laser printing images not allowed by disc operating system        Review of Systems:   CNS: No seizure syncope headaches  or visual changes/depression  Respiratory: No wheezing shortness of breath chest pain or coughing  Cardiovascular: No palpitations no angina  Intestinal GERD No diarrhea or constipation  Urinary: Kidney stones no history of UTIs no gross hematuria no dysuria  Skeletal: No bone or joint pain  Endocrine: No diabetes no thyroid disease  Other:        Allergies: Allergies   Allergen Reactions    Darvon [Propoxyphene] Vertigo    Sulfa (Sulfonamide Antibiotics) Rash      Medications:    Current Outpatient Medications   Medication Sig Dispense Refill    HYDROcodone-acetaminophen (NORCO) 5-325 mg per tablet Take  by mouth.       ciprofloxacin HCl (CIPRO) 250 mg tablet Take 1 Tab by mouth daily for 10 days. 10 Tab 0    ibuprofen (MOTRIN) 800 mg tablet Take  by mouth.  meclizine (ANTIVERT) 25 mg tablet Take 1-2 Tabs by mouth as needed. 6    memantine (NAMENDA) 5 mg tablet Take 1 Tab by mouth two (2) times a day. 6    amitriptyline (ELAVIL) 25 mg tablet Take 25 mg by mouth nightly.  oxyCODONE IR (ROXICODONE) 5 mg immediate release tablet Take 1 Tab by mouth every four (4) hours as needed for Pain for up to 3 days. Max Daily Amount: 30 mg. 18 Tab 0    tamsulosin (FLOMAX) 0.4 mg capsule Take 1 Cap by mouth daily. 15 Cap 0    ondansetron (ZOFRAN ODT) 4 mg disintegrating tablet Take 1 Tab by mouth every eight (8) hours as needed for Nausea. 12 Tab 0    methylphenidate HCl (RITALIN) 10 mg tablet Take 10 mg by mouth daily.          Past Medical History:   Diagnosis Date    Anxiety     Back problem     Constipation     Depression     Depression     Heartburn     High cholesterol     Kidney stone     Neurological disorder     Hematoma removal head    Poor appetite     Sleep trouble     Tiredness     Vertigo       Past Surgical History:   Procedure Laterality Date    HX  SECTION      Earnest 14    Surgery for ectopic pregnancy    HX OTHER SURGICAL  2018    \"cauterized brain bleed, and drained blood\" per patient at [facility]    [address] Marital status:      Spouse name: Not on file    Number of children: Not on file    Years of education: Not on file    Highest education level: Not on file   Occupational History    Not on file   Social Needs    Financial resource strain: Not on file    Food insecurity:     Worry: Not on file     Inability: Not on file    Transportation needs:     Medical: Not on file     Non-medical: Not on file   Tobacco Use    Smoking status: Current Every Day Smoker     Packs/day: 0.25    Smokeless tobacco: Never Used   Substance and Sexual Activity    Alcohol use: No    Drug use: No    Sexual activity: Never   Lifestyle    Physical activity:     Days per week: Not on file     Minutes per session: Not on file    Stress: Not on file   Relationships    Social connections:     Talks on phone: Not on file     Gets together: Not on file     Attends [employer] service: Not on file     Active member of club or organization: Not on file     Attends meetings of clubs or organizations: Not on file     Relationship status: Not on file    Intimate partner violence:     Fear of current or ex partner: Not on file     Emotionally abused: Not on file     Physically abused: Not on file     Forced sexual activity: Not on file   Other Topics Concern     Service Not Asked    Blood Transfusions Not Asked    Caffeine Concern Not Asked    Occupational Exposure Not Asked   [place] Hazards Not Asked    Sleep Concern Not Asked    Stress Concern Not Asked    Weight Concern Not Asked    Special Diet Not Asked    Back Care Not Asked    Exercise Not Asked    Bike Helmet Not Asked   [address] Not Asked    Self-Exams Not Asked   Social History Narrative    Not on file      Family History   Problem Relation Age of Onset    Diabetes Father     Heart Disease Father     Cancer Maternal Grandmother         Physical Examination: Mature female in no apparent distress    Tethered right ureteral stent was removed today intact and un-encrusted      Impression: Stone and stent free status post right ureteroscopy        Plan: Maintain good oral hydration avoid episodes of dehydration    RTC 4 weeks renal ultrasound imaging      More than 1/2 of this 15 minute visit was spent in counselling and coordination of care, as described above. Ramonita Lomeli MD  -electronically signed-    PLEASE NOTE:  This document has been produced using voice recognition software. Unrecognized errors in transcription may be present. 37.1

## 2022-01-25 ENCOUNTER — OFFICE VISIT (OUTPATIENT)
Dept: ORTHOPEDIC SURGERY | Age: 72
End: 2022-01-25
Payer: MEDICARE

## 2022-01-25 VITALS
RESPIRATION RATE: 16 BRPM | HEIGHT: 62 IN | OXYGEN SATURATION: 94 % | WEIGHT: 206 LBS | DIASTOLIC BLOOD PRESSURE: 67 MMHG | SYSTOLIC BLOOD PRESSURE: 131 MMHG | TEMPERATURE: 96.2 F | BODY MASS INDEX: 37.91 KG/M2 | HEART RATE: 111 BPM

## 2022-01-25 DIAGNOSIS — M47.816 LUMBAR FACET ARTHROPATHY: ICD-10-CM

## 2022-01-25 DIAGNOSIS — M54.42 CHRONIC MIDLINE LOW BACK PAIN WITH BILATERAL SCIATICA: ICD-10-CM

## 2022-01-25 DIAGNOSIS — G89.29 CHRONIC MIDLINE LOW BACK PAIN WITH BILATERAL SCIATICA: Primary | ICD-10-CM

## 2022-01-25 DIAGNOSIS — M54.41 CHRONIC MIDLINE LOW BACK PAIN WITH BILATERAL SCIATICA: Primary | ICD-10-CM

## 2022-01-25 DIAGNOSIS — G89.29 CHRONIC MIDLINE LOW BACK PAIN WITH BILATERAL SCIATICA: ICD-10-CM

## 2022-01-25 DIAGNOSIS — M62.838 MUSCLE SPASM: ICD-10-CM

## 2022-01-25 DIAGNOSIS — E66.01 SEVERE OBESITY (BMI 35.0-39.9) WITH COMORBIDITY (HCC): ICD-10-CM

## 2022-01-25 DIAGNOSIS — M54.42 CHRONIC MIDLINE LOW BACK PAIN WITH BILATERAL SCIATICA: Primary | ICD-10-CM

## 2022-01-25 DIAGNOSIS — M54.41 CHRONIC MIDLINE LOW BACK PAIN WITH BILATERAL SCIATICA: ICD-10-CM

## 2022-01-25 DIAGNOSIS — R26.9 GAIT ABNORMALITY: ICD-10-CM

## 2022-01-25 PROCEDURE — 1090F PRES/ABSN URINE INCON ASSESS: CPT | Performed by: PHYSICAL MEDICINE & REHABILITATION

## 2022-01-25 PROCEDURE — G8399 PT W/DXA RESULTS DOCUMENT: HCPCS | Performed by: PHYSICAL MEDICINE & REHABILITATION

## 2022-01-25 PROCEDURE — G8432 DEP SCR NOT DOC, RNG: HCPCS | Performed by: PHYSICAL MEDICINE & REHABILITATION

## 2022-01-25 PROCEDURE — G8427 DOCREV CUR MEDS BY ELIG CLIN: HCPCS | Performed by: PHYSICAL MEDICINE & REHABILITATION

## 2022-01-25 PROCEDURE — 72110 X-RAY EXAM L-2 SPINE 4/>VWS: CPT | Performed by: PHYSICAL MEDICINE & REHABILITATION

## 2022-01-25 PROCEDURE — 1101F PT FALLS ASSESS-DOCD LE1/YR: CPT | Performed by: PHYSICAL MEDICINE & REHABILITATION

## 2022-01-25 PROCEDURE — G8417 CALC BMI ABV UP PARAM F/U: HCPCS | Performed by: PHYSICAL MEDICINE & REHABILITATION

## 2022-01-25 PROCEDURE — G8536 NO DOC ELDER MAL SCRN: HCPCS | Performed by: PHYSICAL MEDICINE & REHABILITATION

## 2022-01-25 PROCEDURE — 99214 OFFICE O/P EST MOD 30 MIN: CPT | Performed by: PHYSICAL MEDICINE & REHABILITATION

## 2022-01-25 PROCEDURE — 3017F COLORECTAL CA SCREEN DOC REV: CPT | Performed by: PHYSICAL MEDICINE & REHABILITATION

## 2022-01-25 RX ORDER — QUETIAPINE FUMARATE 100 MG/1
TABLET, FILM COATED ORAL
COMMUNITY
Start: 2022-01-05 | End: 2022-04-11

## 2022-01-25 RX ORDER — METHYLPREDNISOLONE 4 MG/1
TABLET ORAL
Qty: 1 DOSE PACK | Refills: 1 | Status: SHIPPED | OUTPATIENT
Start: 2022-01-25 | End: 2022-04-11

## 2022-01-25 NOTE — PATIENT INSTRUCTIONS
Low Back Arthritis: Exercises  Introduction  Here are some examples of typical rehabilitation exercises for your condition. Start each exercise slowly. Ease off the exercise if you start to have pain. Your doctor or physical therapist will tell you when you can start these exercises and which ones will work best for you. When you are not being active, find a comfortable position for rest. Some people are comfortable on the floor or a medium-firm bed with a small pillow under their head and another under their knees. Some people prefer to lie on their side with a pillow between their knees. Don't stay in one position for too long. Take short walks (10 to 20 minutes) every 2 to 3 hours. Avoid slopes, hills, and stairs until you feel better. Walk only distances you can manage without pain, especially leg pain. How to do the exercises  Pelvic tilt    1. Lie on your back with your knees bent. 2. \"Brace\" your stomachtighten your muscles by pulling in and imagining your belly button moving toward your spine. 3. Press your lower back into the floor. You should feel your hips and pelvis rock back. 4. Hold for 6 seconds while breathing smoothly. 5. Relax and allow your pelvis and hips to rock forward. 6. Repeat 8 to 12 times. Back stretches    1. Get down on your hands and knees on the floor. 2. Relax your head and allow it to droop. Round your back up toward the ceiling until you feel a nice stretch in your upper, middle, and lower back. Hold this stretch for as long as it feels comfortable, or about 15 to 30 seconds. 3. Return to the starting position with a flat back while you are on your hands and knees. 4. Let your back sway by pressing your stomach toward the floor. Lift your buttocks toward the ceiling. 5. Hold this position for 15 to 30 seconds. 6. Repeat 2 to 4 times. Follow-up care is a key part of your treatment and safety.  Be sure to make and go to all appointments, and call your doctor if you are having problems. It's also a good idea to know your test results and keep a list of the medicines you take. Where can you learn more? Go to http://www.[employer].com/  Enter T094 in the search box to learn more about \"Low Back Arthritis: Exercises. \"  Current as of: July 1, 2021               Content Version: 13.0  © 2006-2021 [employer]. Care instructions adapted under license by [employer] (which disclaims liability or warranty for this information). If you have questions about a medical condition or this instruction, always ask your healthcare professional. [place] [address] any warranty or liability for your use of this information. Lumbar Spinal Stenosis: Care Instructions  Your Care Instructions     Stenosis in the spine is a narrowing of the canal that is around the spinal cord and nerve roots in your back. It can happen as part of aging. Sometimes bone and other tissue grow into this canal and press on the nerves that branch out from the spinal cord. This can cause pain, numbness, and weakness. When it happens in the lower part of your back, it is called lumbar spinal stenosis. It can cause problems in the legs, feet, and rear end (buttocks). You may be able to get relief from the symptoms of spinal stenosis by taking pain medicine. Your doctor may suggest physical therapy and exercises to keep your spine strong and flexible. Some people try steroid shots to reduce swelling. If pain and numbness in your legs are still so bad that you cannot do your normal activities, you may need surgery. Follow-up care is a key part of your treatment and safety. Be sure to make and go to all appointments, and call your doctor if you are having problems. It's also a good idea to know your test results and keep a list of the medicines you take. How can you care for yourself at home? · Take an over-the-counter pain medicine.  Nonsteroidal anti-inflammatory drugs (NSAIDs) such as ibuprofen or naproxen seem to work best. But if you can't take NSAIDs, you can try acetaminophen. Be safe with medicines. Read and follow all instructions on the label. · Do not take two or more pain medicines at the same time unless the doctor told you to. Many pain medicines have acetaminophen, which is Tylenol. Too much acetaminophen (Tylenol) can be harmful. · Stay at a healthy weight. Being overweight puts extra strain on your spine. · Change positions often when you sit or stand. This can ease pain. It may also reduce pressure on the spinal cord and its nerves. · Avoid doing things that make your symptoms worse. Walking downhill and standing for a long time may cause pain. · Stretch and strengthen your back muscles as your doctor or physical therapist recommends. If your doctor says it is okay to do them, these exercises may help. ? Lie on your back with your knees bent. Gently pull one bent knee to your chest. Put that foot back on the floor, and then pull the other knee to your chest.  ? Do pelvic tilts. Lie on your back with your knees bent. Tighten your stomach muscles. Pull your belly button (navel) in and up toward your ribs. You should feel like your back is pressing to the floor and your hips and pelvis are slightly lifting off the floor. Hold for 6 seconds while breathing smoothly. ? Stand with your back flat against a wall. Slowly slide down until your knees are slightly bent. Hold for 10 seconds, then slide back up the wall. · Remove or change anything in your house that may cause you to fall. Keep walkways clear of clutter, electrical cords, and throw rugs. When should you call for help? Call 911 anytime you think you may need emergency care. For example, call if:    · You are unable to move a leg at all. Call your doctor now or seek immediate medical care if:    · You have new or worse symptoms in your legs, belly, or buttocks.  Symptoms may include:  ? Numbness or tingling. ? Weakness. ? Pain.     · You lose bladder or bowel control. Watch closely for changes in your health, and be sure to contact your doctor if:    · You have a fever, lose weight, or don't feel well.     · You are not getting better as expected. Where can you learn more? Go to http://www.gray.com/  Enter X327 in the search box to learn more about \"Lumbar Spinal Stenosis: Care Instructions. \"  Current as of: July 1, 2021               Content Version: 13.0  © 7916-6685 [employer]. Care instructions adapted under license by [email or web address] (which disclaims liability or warranty for this information). If you have questions about a medical condition or this instruction, always ask your healthcare professional. [address] any warranty or liability for your use of this information.

## 2022-01-25 NOTE — PROGRESS NOTES
[facility]  [address] [place], [address]  Phone: [phone]  Fax: [phone]    Pt's YOB: 1950    ASSESSMENT   Diagnoses and all orders for this visit:    1. Chronic midline low back pain with bilateral sciatica  -     AMB POC XRAY, SPINE, LUMBOSACRAL; 4+  -     MRI LUMB SPINE WO CONT; Future    2. Lumbar facet arthropathy  -     MRI LUMB SPINE WO CONT; Future  -     methylPREDNISolone (MEDROL DOSEPACK) 4 mg tablet; Per dose pack instructions    3. Muscle spasm  -     MRI LUMB SPINE WO CONT; Future    4. Gait abnormality    5. Severe obesity (BMI 35.0-39. 9) with comorbidity [facility])         IMPRESSION AND PLAN:  Lucie Andrews is a 80 y.o. female with history of cervical and lumbar pain. She complains of severe lumbar pain radiating down the bilateral legswith standing, beginning in summer 2021 and progressing to interfere with activities of daily living, shortness of breath with ambulation, cough, and dizzy spells. 1) Pt was given information on  exercises. 2) Lumbar x-rays were obtained at today's office visit. 3) A lumbar MRI was ordered to assess for progressive inflammation and stenosis. 4) Pt was prescribed a Medrol Dosepak for inflammatory pain. 5) Pt was advised to discuss vitamin D3 and zinc supplementation with her PCP. 6) Ms. Britney Schwartz has a reminder for a \"due or due soon\" health maintenance. I have asked that she contact her primary care provider, Sharita Reeves MD, for follow-up on this health maintenance. 7)  demonstrated consistency with prescribing. 8) Weight loss recommended  Follow-up and Dispositions    · Return in about 4 weeks (around 2/22/2022) for Diagnostic Test follow up, Medication follow up.   Follow-up and Disposition History             HISTORY OF PRESENT ILLNESS:  Lucie Andrews is a 80 y.o. female with history of cervical and lumbar pain and presents to the office today for follow up. She was last seen in the office on 04/08/2019. Pt complains of severe lumbar pain radiating down the bilateral legs with standing, beginning in summer 2021 and progressing to interfere with activities of daily living. She denies any leg weakness. Pt admits that her ambulation is severely limited, she uses a mobility cart in the grocery store, and her pain while showering is so severe that she vomits. She notes that she does not have a HEP. She also mentions that she has developed shortness of breath with ambulation, which may be secondary to pain, intermittent dizzy spells, and cough. Pt is not currently taking any medications for pain. Pt denies any prior spinal surgeries or ever taking steroids for pain. She notes that she has tremors as a side effect of a psychiatric medication. Pt denies a history of diabetes mellitus or renal disease but notes that she passed a kidney stone 2.5 years ago. Labs from 02/26/2020 were reviewed and demonstrated a 25-OH-vitamin D level of 14.3 ng/dL. She notes that she previously took vitamin D supplementation but no longer does. Pt at this time desires to proceed with physical therapy, medication evaluation, and a lumbar MRI. Of note, pt presents to today's office visit in a wheelchair.     Pain Scale: 10 - Worst pain ever/10    PCP: Alexandria Franklin MD     Past Medical History:   Diagnosis Date    Anxiety     Back problem     Constipation     Depression     Depression     Heartburn     High cholesterol     Kidney stone     Neurological disorder     Hematoma removal head    Poor appetite     Sleep trouble     Tiredness     Vertigo         Social History     Socioeconomic History    Marital status:      Spouse name: Not on file    Number of children: Not on file    Years of education: Not on file    Highest education level: Not on file   Occupational History    Not on file   Tobacco Use    Smoking status: Former Smoker     Packs/day: 0.25 Quit date: 2021     Years since quittin.7    Smokeless tobacco: Never Used   Substance and Sexual Activity    Alcohol use: No    Drug use: No    Sexual activity: Never   Other Topics Concern     Service Not Asked    Blood Transfusions Not Asked    Caffeine Concern Not Asked    Occupational Exposure Not Asked    Hobby Hazards Not Asked    Sleep Concern Not Asked    Stress Concern Not Asked    Weight Concern Not Asked    Special Diet Not Asked    Back Care Not Asked    Exercise Not Asked    Bike Helmet Not Asked    Seat Belt Not Asked    Self-Exams Not Asked   Social History Narrative    Not on file     Social Determinants of Health     Financial Resource Strain:     Difficulty of Paying Living Expenses: Not on file   Food Insecurity:     Worried About Running Out of Food in the Last Year: Not on file    [place] of Food in the Last Year: Not on file   Transportation Needs:     Lack of Transportation (Medical): Not on file    Lack of Transportation (Non-Medical):  Not on file   Physical Activity:     Days of Exercise per Week: Not on file    Minutes of Exercise per Session: Not on file   Stress:     Feeling of Stress : Not on file   Social Connections:     Frequency of Communication with Friends and Family: Not on file    Frequency of Social Gatherings with Friends and Family: Not on file    Attends Yazidism Services: Not on file    Active Member of [address] [place] [address] [employer] or Organizations: Not on file    Attends Club or Organization Meetings: Not on file    Marital Status: Not on file   Intimate Partner Violence:     Fear of Current or Ex-Partner: Not on file    Emotionally Abused: Not on file    Physically Abused: Not on file    Sexually Abused: Not on file   Housing Stability:     Unable to Pay for Housing in the Last Year: Not on file    Number of [place] in the Last Year: Not on file    Unstable Housing in the Last Year: Not on file       Current Outpatient Medications Medication Sig Dispense Refill    QUEtiapine (SEROquel) 100 mg tablet TAKE 1 TABLET BY MOUTH EVERY NIGHT AT NIGHT AND AT BEDTIME      methylPREDNISolone (MEDROL DOSEPACK) 4 mg tablet Per dose pack instructions 1 Dose Pack 1    sertraline (ZOLOFT) 25 mg tablet TK 1 T PO QAM  0    amitriptyline (ELAVIL) 25 mg tablet Take 25 mg by mouth nightly.  HYDROcodone-acetaminophen (NORCO) 5-325 mg per tablet Take  by mouth. (Patient not taking: Reported on 1/25/2022)      ibuprofen (MOTRIN) 800 mg tablet Take  by mouth. (Patient not taking: Reported on 1/25/2022)      tamsulosin (FLOMAX) 0.4 mg capsule Take 1 Cap by mouth daily. (Patient not taking: Reported on 1/25/2022) 15 Cap 0    ondansetron (ZOFRAN ODT) 4 mg disintegrating tablet Take 1 Tab by mouth every eight (8) hours as needed for Nausea. (Patient not taking: Reported on 1/25/2022) 12 Tab 0    meclizine (ANTIVERT) 25 mg tablet Take 1-2 Tabs by mouth as needed. (Patient not taking: Reported on 1/25/2022)  6    memantine (NAMENDA) 5 mg tablet Take 1 Tab by mouth two (2) times a day. (Patient not taking: Reported on 1/25/2022)  6    methylphenidate HCl (RITALIN) 10 mg tablet Take 10 mg by mouth daily. (Patient not taking: Reported on 1/25/2022)         Allergies   Allergen Reactions    Darvon [Propoxyphene] Vertigo    Sulfa (Sulfonamide Antibiotics) Rash         REVIEW OF SYSTEMS    Constitutional: Negative for fever, chills, or weight change. Respiratory: Positive for cough and shortness of breath. Cardiovascular: Negative for chest pain or palpitations. Gastrointestinal: Negative for acid reflux, change in bowel habits, or constipation. Genitourinary: Negative for dysuria and flank pain. Musculoskeletal: Positive for lumbar, right hip, and bilateral leg pain. Skin: Negative for rash. Neurological: Negative for headaches and numbness. Positive for dizziness. Endo/Heme/Allergies: Negative for increased bruising.    Psychiatric/Behavioral: Negative for difficulty with sleep. As per HPI    PHYSICAL EXAMINATION  Visit Vitals  /67 (BP 1 Location: Right arm, BP Patient Position: Sitting, BP Cuff Size: Adult)   Pulse (!) 111   Temp (!) 96.2 °F (35.7 °C) (Tympanic)   Resp 16   Ht 5' 2\" (1.575 m)   Wt 206 lb (93.4 kg)   SpO2 94%   BMI 37.68 kg/m²       Constitutional: Awake, alert, and in no acute distress. Neurological:  Sensation to light touch is intact. Skin: warm, dry, and intact. Musculoskeletal: Mild tenderness to palpation over the upper trapezius. Moderate pain with extension, axial loading, and less with forward flexion. No pain with internal or external rotation of her hips. Negative straight leg raise bilaterally. Pt presents to today's office visit in a wheelchair. Biceps  Triceps Deltoids Wrist Ext Wrist Flex Hand Intrin   Right +4/5 +4/5 +4/5 +4/5 +4/5 +4/5   Left +4/5 +4/5 +4/5 +4/5 +4/5 +4/5      Hip Flex  Quads Hamstrings Ankle DF EHL Ankle PF   Right +4/5 +4/5 +4/5 +4/5 +4/5 +4/5   Left +4/5 +4/5 +4/5 +4/5 +4/5 +4/5     IMAGING:  Lumbar spine 4V x-rays from 01/25/2022 were personally reviewed and demonstrated:  Mild levoscoliosis; multi-level degenerative facets; DDD L5/S1; listhesis L4/5; no instability    Cervical spine CT from 03/20/2018 was personally reviewed with the patient and demonstrated:  FINDINGS:  Bones - acute: There is straightening of the normal cervical lordosis centered at the levels of no significant degenerative change, C5-T1.  No evidence of acute fracture or subluxation is identified. Vertebral body heights are preserved. Occipital condylar/C1/C2 relationships are normal.  The odontoid is intact. The predental space and Basion-dental interval are normal.     Bones - chronic: Multilevel degenerative change most prominently at C5/6, C6/7 and C7/T1 with disc space narrowing, vacuum phenomenon and osteophytosis. Right and C7-T1. No evidence of critical central canal stenosis.  No destructive changes are identified in the cervical spine bone marrow. Soft Tissues: Normal. No blood in the cervical spinal canal. The prevertebral soft tissues are normal.    Lung apices: Normal.     IMPRESSION:  1.  No evidence of acute fracture or traumatic subluxation. 2.  Straightening of the normal cervical lordosis. Degenerative changes as above. Written by Luciana Tineo, as dictated by Mela Nageotte, MD.  I, Dr. Mela Nageotte confirm that all documentation is accurate.

## 2022-01-25 NOTE — PROGRESS NOTES
Chief Complaint   Patient presents with    Follow-up       Pt preferred language for health care discussion is english. Is someone accompanying this pt? no    Is the patient using any DME equipment during [address]? no    Depression Screening:  3 most recent PHQ Screens 4/8/2019 7/12/2018   PHQ Not Done Active Diagnosis of Depression or Bipolar Disorder -   Little interest or pleasure in doing things Several days Nearly every day   Feeling down, depressed, irritable, or hopeless Several days Nearly every day   Total Score PHQ 2 2 6   Trouble falling or staying asleep, or sleeping too much - Not at all   Feeling tired or having little energy - Not at all   Poor appetite, weight loss, or overeating - Not at all   Feeling bad about yourself - or that you are a failure or have let yourself or your family down - Not at all   Trouble concentrating on things such as school, work, reading, or watching TV - Nearly every day   Moving or speaking so slowly that other people could have noticed; or the opposite being so fidgety that others notice - Not at all   Thoughts of being better off dead, or hurting yourself in some way - Not at all   PHQ 9 Score - 9   How difficult have these problems made it for you to do your work, take care of your home and get along with others - Very difficult       Learning Assessment:  Learning Assessment 4/8/2019   PRIMARY LEARNER Patient   HIGHEST LEVEL OF EDUCATION - PRIMARY LEARNER  GRADUATED HIGH SCHOOL OR GED   BARRIERS PRIMARY LEARNER COGNITIVE   CO-LEARNER CAREGIVER No   PRIMARY LANGUAGE ENGLISH   LEARNER PREFERENCE PRIMARY DEMONSTRATION   ANSWERED BY Patient   RELATIONSHIP SELF       Abuse Screening:  Abuse Screening Questionnaire 5/2/2019   Do you ever feel afraid of your partner? N   Is it safe for you to go home? Y       Fall Risk  Fall Risk Assessment, last 12 mths 4/8/2019   Able to walk? Yes   Fall in past 12 months? Yes   Number of falls in past 12 months 1   Fall with injury?  1 Advance Directive:  1. Do you have an advance directive in place? Patient Reply:no    2. If not, would you like material regarding how to put one in place? Patient Reply: no    2. Per patient no changes to their ACP contact no. Coordination of Care:  1. Have you been to the ER, urgent care clinic since your last visit? Hospitalized since your last visit? no    2. Have you seen or consulted any other health care providers outside of the [address] [place] [address] since your last visit? Include any pap smears or colon screening.  no

## 2022-03-23 ENCOUNTER — HOSPITAL ENCOUNTER (OUTPATIENT)
Dept: MRI IMAGING | Age: 72
Discharge: HOME | End: 2022-03-23
Attending: PHYSICAL MEDICINE & REHABILITATION
Payer: MEDICARE

## 2022-03-23 PROCEDURE — 72148 MRI LUMBAR SPINE W/O DYE: CPT

## 2022-04-25 ENCOUNTER — TELEPHONE (OUTPATIENT)
Dept: ORTHOPEDIC SURGERY | Age: 72
End: 2022-04-25

## 2022-04-25 ENCOUNTER — OFFICE VISIT (OUTPATIENT)
Dept: ORTHOPEDIC SURGERY | Age: 72
End: 2022-04-25
Payer: MEDICARE

## 2022-04-25 VITALS
RESPIRATION RATE: 16 BRPM | OXYGEN SATURATION: 96 % | BODY MASS INDEX: 35.41 KG/M2 | HEART RATE: 97 BPM | WEIGHT: 192.4 LBS | HEIGHT: 62 IN | TEMPERATURE: 96.8 F

## 2022-04-25 DIAGNOSIS — N28.89 LEFT RENAL MASS: ICD-10-CM

## 2022-04-25 DIAGNOSIS — M54.50 CHRONIC MIDLINE LOW BACK PAIN WITHOUT SCIATICA: ICD-10-CM

## 2022-04-25 DIAGNOSIS — R93.89 ABNORMAL MRI: ICD-10-CM

## 2022-04-25 DIAGNOSIS — R26.9 GAIT ABNORMALITY: ICD-10-CM

## 2022-04-25 DIAGNOSIS — R93.89 ABNORMAL MRI: Primary | ICD-10-CM

## 2022-04-25 DIAGNOSIS — D50.9 IRON DEFICIENCY ANEMIA, UNSPECIFIED IRON DEFICIENCY ANEMIA TYPE: ICD-10-CM

## 2022-04-25 DIAGNOSIS — M62.838 MUSCLE SPASM: ICD-10-CM

## 2022-04-25 DIAGNOSIS — G89.29 CHRONIC MIDLINE LOW BACK PAIN WITHOUT SCIATICA: ICD-10-CM

## 2022-04-25 DIAGNOSIS — M47.816 LUMBAR FACET ARTHROPATHY: ICD-10-CM

## 2022-04-25 PROCEDURE — 99214 OFFICE O/P EST MOD 30 MIN: CPT | Performed by: PHYSICAL MEDICINE & REHABILITATION

## 2022-04-25 PROCEDURE — 1090F PRES/ABSN URINE INCON ASSESS: CPT | Performed by: PHYSICAL MEDICINE & REHABILITATION

## 2022-04-25 PROCEDURE — G8432 DEP SCR NOT DOC, RNG: HCPCS | Performed by: PHYSICAL MEDICINE & REHABILITATION

## 2022-04-25 PROCEDURE — G8417 CALC BMI ABV UP PARAM F/U: HCPCS | Performed by: PHYSICAL MEDICINE & REHABILITATION

## 2022-04-25 PROCEDURE — 3017F COLORECTAL CA SCREEN DOC REV: CPT | Performed by: PHYSICAL MEDICINE & REHABILITATION

## 2022-04-25 PROCEDURE — 1101F PT FALLS ASSESS-DOCD LE1/YR: CPT | Performed by: PHYSICAL MEDICINE & REHABILITATION

## 2022-04-25 PROCEDURE — G8536 NO DOC ELDER MAL SCRN: HCPCS | Performed by: PHYSICAL MEDICINE & REHABILITATION

## 2022-04-25 PROCEDURE — G8427 DOCREV CUR MEDS BY ELIG CLIN: HCPCS | Performed by: PHYSICAL MEDICINE & REHABILITATION

## 2022-04-25 PROCEDURE — G8399 PT W/DXA RESULTS DOCUMENT: HCPCS | Performed by: PHYSICAL MEDICINE & REHABILITATION

## 2022-04-25 RX ORDER — PREDNISONE 10 MG/1
TABLET ORAL
Qty: 32 TABLET | Refills: 0 | Status: SHIPPED | OUTPATIENT
Start: 2022-04-25

## 2022-04-25 NOTE — TELEPHONE ENCOUNTER
I have received a request to schedule CT Abdomen/Pelvis at [address] [place] [address] . The pre-authorization request has been [place] [facility] and is pending review at this time.

## 2022-04-25 NOTE — PATIENT INSTRUCTIONS
Learning About Medial Branch Block and Neurotomy  What are medial branch block and neurotomy? Facet joints connect your vertebrae to each other. Problems in these joints can cause chronic (long-term) pain in the neck or back. Medial branch nerves are the nerves that carry many of the pain messages from your facet joints. Radiofrequency medial branch neurotomy is a type of medial branch neurotomy that is used to relieve arthritis pain. It uses radio waves to damage nerves in your neck or back so that they can no longer send pain messages to your brain. Before your doctor knows if a neurotomy will help you, you will get a medial branch block to find out if certain nerves are the ones that are a source of your pain. You will need two separate visits to the outpatient center or hospital to have both procedures. You will need someone to drive you home. How is a medial branch block done? The doctor will use a tiny needle to numb the skin where you will get the block. Then the doctor puts the block needle into the numbed area. You may feel some pressure, but you should not feel pain. Using fluoroscopy (live X-ray) to guide the needle, the doctor injects medicine onto one or more nerves to make them numb. If you get relief from your pain in the next 4 to 6 hours, it's a sign that those nerves may be contributing to your pain. The relief will last only a short time. You may then have a medial branch neurotomy at a later visit to try to get longer relief. How is a medial branch neurotomy done? The doctor will use a tiny needle to numb the skin where you will get the neurotomy. Then the doctor puts the neurotomy needle into the numbed area. You may feel some pressure. Using fluoroscopy (live X-ray) to guide the needle, the doctor sends radio waves through the needle to the nerve for 60 to 90 seconds. The radio waves heat the nerve, which damages it. The doctor may do this several times.  And more than one nerve may be treated. How long do medial branch block and neurotomy take? It takes 20 to 30 minutes to get the block. You can go home after the doctor watches you for about an hour. It takes 45 to 90 minutes to get a neurotomy, depending on how many nerves are heated. You will probably go home 30 to 60 minutes after the procedure. What can you expect after a neurotomy? You will get instructions on how to report how much pain you have when you are at home. You may feel a little sore or tender at the injection site at first. But after a successful neurotomy, most people have pain relief right away. It often lasts for several months, but your pain may come back. If your pain does come back, it may mean that the damaged nerve has healed and can send pain messages again. Or it can mean that a different nerve is causing pain. Your doctor will discuss your options with you. Follow-up care is a key part of your treatment and safety. Be sure to make and go to all appointments, and call your doctor if you are having problems. It's also a good idea to know your test results and keep a list of the medicines you take. Where can you learn more? Go to http://www.gray.com/  Enter T494 in the search box to learn more about \"Learning About Medial Branch Block and Neurotomy. \"  Current as of: December 13, 2021               Content Version: 13.2  © 8734-8877 [employer]wise, Incorporated. Care instructions adapted under license by [employer] (which disclaims liability or warranty for this information). If you have questions about a medical condition or this instruction, always ask your healthcare professional. [place] [address] any warranty or liability for your use of this information. Low Back Arthritis: Exercises  Introduction  Here are some examples of typical rehabilitation exercises for your condition. Start each exercise slowly.  Ease off the exercise if you start to have pain. Your doctor or physical therapist will tell you when you can start these exercises and which ones will work best for you. When you are not being active, find a comfortable position for rest. Some people are comfortable on the floor or a medium-firm bed with a small pillow under their head and another under their knees. Some people prefer to lie on their side with a pillow between their knees. Don't stay in one position for too long. Take short walks (10 to 20 minutes) every 2 to 3 hours. Avoid slopes, hills, and stairs until you feel better. Walk only distances you can manage without pain, especially leg pain. How to do the exercises  Pelvic tilt    1. Lie on your back with your knees bent. 2. \"Brace\" your stomach--tighten your muscles by pulling in and imagining your belly button moving toward your spine. 3. Press your lower back into the floor. You should feel your hips and pelvis rock back. 4. Hold for 6 seconds while breathing smoothly. 5. Relax and allow your pelvis and hips to rock forward. 6. Repeat 8 to 12 times. Back stretches    1. Get down on your hands and knees on the floor. 2. Relax your head and allow it to droop. Round your back up toward the ceiling until you feel a nice stretch in your upper, middle, and lower back. Hold this stretch for as long as it feels comfortable, or about 15 to 30 seconds. 3. Return to the starting position with a flat back while you are on your hands and knees. 4. Let your back sway by pressing your stomach toward the floor. Lift your buttocks toward the ceiling. 5. Hold this position for 15 to 30 seconds. 6. Repeat 2 to 4 times. Follow-up care is a key part of your treatment and safety. Be sure to make and go to all appointments, and call your doctor if you are having problems. It's also a good idea to know your test results and keep a list of the medicines you take. Where can you learn more?   Go to http://www.gray.com/  Enter X317 in the search box to learn more about \"Low Back Arthritis: Exercises. \"  Current as of: July 1, 2021               Content Version: 13.2  © 7679-5905 [employer]wise, Inc[employer]. Care instructions adapted under license by [employer] (which disclaims liability or warranty for this information). If you have questions about a medical condition or this instruction, always ask your healthcare professional. [place] [address] any warranty or liability for your use of this information.
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
## 2022-04-26 NOTE — TELEPHONE ENCOUNTER
CT Abd/Pelvis with and without contrast has been faxed to [address] for scheduling, [phone], fax [phone]. [employer] pre-authorization [id number], effective 05/06/22-06/05/22.

## 2022-05-05 ENCOUNTER — TELEPHONE (OUTPATIENT)
Dept: ORTHOPEDIC SURGERY | Age: 72
End: 2022-05-05

## 2022-05-05 NOTE — TELEPHONE ENCOUNTER
Central scheduling/auth dept called to confirm if patient was keeping CT appointment at [facility] for 5/11. Notes indicate patient prefers [facility] for scheduling. Patient was contacted and confirmed that she chose [facility] (even if she has to wait to schedule) and 5/11 needs to be canceled. She was advised that she will need to cancel this appointment in order to schedule in [place]. Patient was provided phone number [phone] and was also transferred to them to cancel 5/11.

## 2022-06-13 ENCOUNTER — TELEPHONE (OUTPATIENT)
Dept: ORTHOPEDIC SURGERY | Age: 72
End: 2022-06-13

## 2022-06-13 NOTE — TELEPHONE ENCOUNTER
Patient called stating that she was supposed to have some testing for her kidneys done at [facility] but when she went there they did not have her scheduled.  She asked if it can be rescheduled at [facility]. Please advise patient at [phone]

## 2022-06-14 NOTE — TELEPHONE ENCOUNTER
Patient's initial request was sent to and scheduled with [address] [place] [address]. [facility] [employer] [facility] pre-authorization has . After speaking with Ms. Kathy White the records have been faxed to [address] [place] [address] to authorize and schedule, [phone], fax [phone]. She is aware.

## (undated) DEVICE — ENDOSCOPIC VALVE WITH ADAPTER.: Brand: SURSEAL® II

## (undated) DEVICE — SYR 10ML CTRL LR LCK NSAF LF --

## (undated) DEVICE — GAUZE SPONGES,16 PLY: Brand: CURITY

## (undated) DEVICE — GDWIRE URET STR STD .038X150 -- ZIPWIRE STD

## (undated) DEVICE — SOLUTION IV 1000ML 0.9% SOD CHL

## (undated) DEVICE — OPEN-END FLEXI-TIP URETERAL CATHETER: Brand: FLEXI-TIP

## (undated) DEVICE — IRRIGATION KT PMP SGL ACT SAPS -- BX/5

## (undated) DEVICE — CATH URET 5FRX70CM W/OPN END -- BX/20

## (undated) DEVICE — STER SINGLE BASIN SET W/BOWLS: Brand: CARDINAL HEALTH

## (undated) DEVICE — LUB SURG MEDC STRL 2OZ TUBE MC -- MEDICHOICE

## (undated) DEVICE — BAG DRAINAGE CUST DISP

## (undated) DEVICE — SOLUTION IRRIG 3000ML 0.9% SOD CHL FLX CONT 0797208] ICU MEDICAL INC]

## (undated) DEVICE — MEDI-VAC NON-CONDUCTIVE SUCTION TUBING: Brand: CARDINAL HEALTH

## (undated) DEVICE — URETERAL ACCESS SHEATH SET: Brand: NAVIGATOR

## (undated) DEVICE — 3M™ BAIR PAWS FLEX™ WARMING GOWN, STANDARD, 20 PER CASE 81003: Brand: BAIR PAWS™

## (undated) DEVICE — Device: Brand: MEDEX

## (undated) DEVICE — STERILE POLYISOPRENE POWDER-FREE SURGICAL GLOVES: Brand: PROTEXIS

## (undated) DEVICE — KENDALL SCD EXPRESS SLEEVES, KNEE LENGTH, MEDIUM: Brand: KENDALL SCD

## (undated) DEVICE — CATH URETH INTMIT ROB 10FR FUN -- CONVERT TO ITEM 151712

## (undated) DEVICE — Z DISCONTINUED BY MEDLINE USE 2711682 TRAY SKIN PREP DRY W/ PREM GLV

## (undated) DEVICE — Y-TYPE TUR/BLADDER IRRIGATION SET, REGULATING CLAMP

## (undated) DEVICE — Z DUP USE 2565107 PACK SURG PROC LEG CYSTO T-DRAPE REINF TBL CVR HND TWL

## (undated) DEVICE — KIT CLN UP BON SECOURS MARYV

## (undated) DEVICE — 4-PORT MANIFOLD: Brand: NEPTUNE 2

## (undated) DEVICE — SOFT SILICONE HYDROCELLULAR SACRUM DRESSING WITH LOCK AWAY LAYER: Brand: ALLEVYN LIFE SACRUM (LARGE) PACK OF 10

## (undated) DEVICE — GOWN,PREVENTION PLUS,XLN/XL,ST,24/CS: Brand: MEDLINE